# Patient Record
Sex: FEMALE | Race: WHITE | Employment: OTHER | URBAN - METROPOLITAN AREA
[De-identification: names, ages, dates, MRNs, and addresses within clinical notes are randomized per-mention and may not be internally consistent; named-entity substitution may affect disease eponyms.]

---

## 2023-08-09 ENCOUNTER — HOSPITAL ENCOUNTER (INPATIENT)
Age: 83
LOS: 1 days | Discharge: HOME | End: 2023-08-10
Attending: EMERGENCY MEDICINE | Admitting: STUDENT IN AN ORGANIZED HEALTH CARE EDUCATION/TRAINING PROGRAM
Payer: COMMERCIAL

## 2023-08-09 ENCOUNTER — APPOINTMENT (OUTPATIENT)
Dept: CT IMAGING | Age: 83
End: 2023-08-09
Payer: COMMERCIAL

## 2023-08-09 DIAGNOSIS — R10.84 GENERALIZED ABDOMINAL PAIN: Primary | ICD-10-CM

## 2023-08-09 DIAGNOSIS — K85.90 ACUTE PANCREATITIS, UNSPECIFIED COMPLICATION STATUS, UNSPECIFIED PANCREATITIS TYPE: ICD-10-CM

## 2023-08-09 LAB
ALBUMIN SERPL-MCNC: 4.2 G/DL (ref 3.5–5.2)
ALBUMIN/GLOB SERPL: 1.3 {RATIO} (ref 1–2.5)
ALP SERPL-CCNC: 112 U/L (ref 35–104)
ALT SERPL-CCNC: 36 U/L (ref 5–33)
AMYLASE SERPL-CCNC: 151 U/L (ref 28–100)
ANION GAP SERPL CALCULATED.3IONS-SCNC: 12 MMOL/L (ref 9–17)
AST SERPL-CCNC: 27 U/L
BACTERIA URNS QL MICRO: ABNORMAL
BASOPHILS # BLD: 0.1 K/UL (ref 0–0.2)
BASOPHILS NFR BLD: 1 % (ref 0–2)
BILIRUB SERPL-MCNC: 0.4 MG/DL (ref 0.3–1.2)
BILIRUB UR QL STRIP: NEGATIVE
BUN SERPL-MCNC: 33 MG/DL (ref 8–23)
CALCIUM SERPL-MCNC: 9.5 MG/DL (ref 8.6–10.4)
CHLORIDE SERPL-SCNC: 97 MMOL/L (ref 98–107)
CLARITY UR: CLEAR
CO2 SERPL-SCNC: 24 MMOL/L (ref 20–31)
COLOR UR: YELLOW
CREAT SERPL-MCNC: 1.4 MG/DL (ref 0.5–0.9)
EOSINOPHIL # BLD: 0.8 K/UL (ref 0–0.4)
EOSINOPHILS RELATIVE PERCENT: 7 % (ref 1–4)
EPI CELLS #/AREA URNS HPF: ABNORMAL /HPF (ref 0–5)
ERYTHROCYTE [DISTWIDTH] IN BLOOD BY AUTOMATED COUNT: 17.4 % (ref 12.5–15.4)
GFR SERPL CREATININE-BSD FRML MDRD: 37 ML/MIN/1.73M2
GLUCOSE SERPL-MCNC: 97 MG/DL (ref 70–99)
GLUCOSE UR STRIP-MCNC: NEGATIVE MG/DL
HCT VFR BLD AUTO: 33.9 % (ref 36–46)
HGB BLD-MCNC: 11.3 G/DL (ref 12–16)
HGB UR QL STRIP.AUTO: ABNORMAL
KETONES UR STRIP-MCNC: NEGATIVE MG/DL
LEUKOCYTE ESTERASE UR QL STRIP: NEGATIVE
LIPASE SERPL-CCNC: 74 U/L (ref 13–60)
LYMPHOCYTES NFR BLD: 1.1 K/UL (ref 1–4.8)
LYMPHOCYTES RELATIVE PERCENT: 10 % (ref 24–44)
MCH RBC QN AUTO: 30.9 PG (ref 26–34)
MCHC RBC AUTO-ENTMCNC: 33.4 G/DL (ref 31–37)
MCV RBC AUTO: 92.5 FL (ref 80–100)
MONOCYTES NFR BLD: 1.2 K/UL (ref 0.1–1.2)
MONOCYTES NFR BLD: 10 % (ref 2–11)
NEUTROPHILS NFR BLD: 72 % (ref 36–66)
NEUTS SEG NFR BLD: 8.2 K/UL (ref 1.8–7.7)
NITRITE UR QL STRIP: NEGATIVE
PH UR STRIP: 6.5 [PH] (ref 5–8)
PLATELET # BLD AUTO: 237 K/UL (ref 140–450)
PMV BLD AUTO: 7.2 FL (ref 6–12)
POTASSIUM SERPL-SCNC: 4 MMOL/L (ref 3.7–5.3)
PROT SERPL-MCNC: 7.4 G/DL (ref 6.4–8.3)
PROT UR STRIP-MCNC: NEGATIVE MG/DL
RBC # BLD AUTO: 3.67 M/UL (ref 4–5.2)
RBC #/AREA URNS HPF: ABNORMAL /HPF (ref 0–2)
SODIUM SERPL-SCNC: 133 MMOL/L (ref 135–144)
SP GR UR STRIP: 1 (ref 1–1.03)
TROPONIN I SERPL HS-MCNC: 26 NG/L (ref 0–14)
TROPONIN I SERPL HS-MCNC: 30 NG/L (ref 0–14)
UROBILINOGEN UR STRIP-ACNC: NORMAL EU/DL (ref 0–1)
WBC #/AREA URNS HPF: ABNORMAL /HPF (ref 0–5)
WBC OTHER # BLD: 11.3 K/UL (ref 3.5–11)

## 2023-08-09 PROCEDURE — 84484 ASSAY OF TROPONIN QUANT: CPT

## 2023-08-09 PROCEDURE — 85025 COMPLETE CBC W/AUTO DIFF WBC: CPT

## 2023-08-09 PROCEDURE — 83690 ASSAY OF LIPASE: CPT

## 2023-08-09 PROCEDURE — 80053 COMPREHEN METABOLIC PANEL: CPT

## 2023-08-09 PROCEDURE — 2580000003 HC RX 258

## 2023-08-09 PROCEDURE — 99285 EMERGENCY DEPT VISIT HI MDM: CPT

## 2023-08-09 PROCEDURE — 2580000003 HC RX 258: Performed by: NURSE PRACTITIONER

## 2023-08-09 PROCEDURE — 81001 URINALYSIS AUTO W/SCOPE: CPT

## 2023-08-09 PROCEDURE — 82150 ASSAY OF AMYLASE: CPT

## 2023-08-09 PROCEDURE — 74176 CT ABD & PELVIS W/O CONTRAST: CPT

## 2023-08-09 PROCEDURE — 2060000000 HC ICU INTERMEDIATE R&B

## 2023-08-09 PROCEDURE — 36415 COLL VENOUS BLD VENIPUNCTURE: CPT

## 2023-08-09 PROCEDURE — 93005 ELECTROCARDIOGRAM TRACING: CPT

## 2023-08-09 RX ORDER — MAGNESIUM GLUCONATE 27 MG(500)
500 TABLET ORAL DAILY
COMMUNITY

## 2023-08-09 RX ORDER — HEPARIN SODIUM 5000 [USP'U]/ML
5000 INJECTION, SOLUTION INTRAVENOUS; SUBCUTANEOUS 2 TIMES DAILY
Status: DISCONTINUED | OUTPATIENT
Start: 2023-08-09 | End: 2023-08-10 | Stop reason: HOSPADM

## 2023-08-09 RX ORDER — 0.9 % SODIUM CHLORIDE 0.9 %
1000 INTRAVENOUS SOLUTION INTRAVENOUS ONCE
Status: COMPLETED | OUTPATIENT
Start: 2023-08-09 | End: 2023-08-09

## 2023-08-09 RX ORDER — SODIUM CHLORIDE 9 MG/ML
INJECTION, SOLUTION INTRAVENOUS CONTINUOUS
Status: DISCONTINUED | OUTPATIENT
Start: 2023-08-09 | End: 2023-08-10 | Stop reason: HOSPADM

## 2023-08-09 RX ORDER — MAGNESIUM SULFATE 1 G/100ML
1000 INJECTION INTRAVENOUS PRN
Status: DISCONTINUED | OUTPATIENT
Start: 2023-08-09 | End: 2023-08-10 | Stop reason: HOSPADM

## 2023-08-09 RX ORDER — ONDANSETRON 4 MG/1
4 TABLET, ORALLY DISINTEGRATING ORAL EVERY 8 HOURS PRN
Status: DISCONTINUED | OUTPATIENT
Start: 2023-08-09 | End: 2023-08-10 | Stop reason: HOSPADM

## 2023-08-09 RX ORDER — LENALIDOMIDE 5 MG/1
5 CAPSULE ORAL DAILY
COMMUNITY

## 2023-08-09 RX ORDER — ONDANSETRON 2 MG/ML
4 INJECTION INTRAMUSCULAR; INTRAVENOUS EVERY 6 HOURS PRN
Status: DISCONTINUED | OUTPATIENT
Start: 2023-08-09 | End: 2023-08-10 | Stop reason: HOSPADM

## 2023-08-09 RX ORDER — LANOLIN ALCOHOL/MO/W.PET/CERES
500 CREAM (GRAM) TOPICAL DAILY
COMMUNITY

## 2023-08-09 RX ORDER — SODIUM CHLORIDE 0.9 % (FLUSH) 0.9 %
5-40 SYRINGE (ML) INJECTION EVERY 12 HOURS SCHEDULED
Status: DISCONTINUED | OUTPATIENT
Start: 2023-08-09 | End: 2023-08-10 | Stop reason: HOSPADM

## 2023-08-09 RX ORDER — SODIUM CHLORIDE 9 MG/ML
INJECTION, SOLUTION INTRAVENOUS PRN
Status: DISCONTINUED | OUTPATIENT
Start: 2023-08-09 | End: 2023-08-10 | Stop reason: HOSPADM

## 2023-08-09 RX ORDER — SODIUM CHLORIDE 0.9 % (FLUSH) 0.9 %
5-40 SYRINGE (ML) INJECTION PRN
Status: DISCONTINUED | OUTPATIENT
Start: 2023-08-09 | End: 2023-08-10 | Stop reason: HOSPADM

## 2023-08-09 RX ORDER — ACYCLOVIR 200 MG/1
200 CAPSULE ORAL 2 TIMES DAILY
COMMUNITY

## 2023-08-09 RX ORDER — LANOLIN ALCOHOL/MO/W.PET/CERES
1000 CREAM (GRAM) TOPICAL DAILY
COMMUNITY

## 2023-08-09 RX ORDER — POTASSIUM CHLORIDE 7.45 MG/ML
10 INJECTION INTRAVENOUS PRN
Status: DISCONTINUED | OUTPATIENT
Start: 2023-08-09 | End: 2023-08-10 | Stop reason: HOSPADM

## 2023-08-09 RX ORDER — ROSUVASTATIN CALCIUM 5 MG/1
5 TABLET, COATED ORAL DAILY
COMMUNITY

## 2023-08-09 RX ORDER — ASPIRIN 81 MG/1
81 TABLET ORAL DAILY
COMMUNITY

## 2023-08-09 RX ADMIN — SODIUM CHLORIDE, PRESERVATIVE FREE 10 ML: 5 INJECTION INTRAVENOUS at 23:55

## 2023-08-09 RX ADMIN — SODIUM CHLORIDE: 9 INJECTION, SOLUTION INTRAVENOUS at 23:55

## 2023-08-09 RX ADMIN — SODIUM CHLORIDE 1000 ML: 9 INJECTION, SOLUTION INTRAVENOUS at 19:28

## 2023-08-09 ASSESSMENT — PAIN - FUNCTIONAL ASSESSMENT: PAIN_FUNCTIONAL_ASSESSMENT: 0-10

## 2023-08-09 ASSESSMENT — ENCOUNTER SYMPTOMS
BLOOD IN STOOL: 0
VOMITING: 0
CONSTIPATION: 0
RECTAL PAIN: 0
NAUSEA: 0
ABDOMINAL PAIN: 1
DIARRHEA: 1

## 2023-08-09 ASSESSMENT — PAIN SCALES - GENERAL
PAINLEVEL_OUTOF10: 0
PAINLEVEL_OUTOF10: 6

## 2023-08-09 NOTE — ED PROVIDER NOTES
[facility] Emergency Department  [address] [address] [facility] [address]  Phone: [phone]  Fax: Leda Hull          Pt Name: Arabella Kincaid  MRN: [id number]  [address] [facility] [place] 1940  Date of evaluation: 8/9/2023      CHIEF COMPLAINT       Chief Complaint   Patient presents with    Abdominal Pain     Reports abd for a few days. Denies trouble with urinating or bowel movements. Denies n/v/d. HISTORY OF PRESENT ILLNESS       Arabella Kincaid is a 78 y.o. female who presents with abdominal pain. Patient reports having 3 days of abdominal pain that started epigastric and has now lowered to the left lower quadrant. Patient describes the pain as tenderness, denies any nausea or vomiting, fevers or chills. Patient had diarrhea a few days ago also reports constipation here and there. Patient states that she took Pepto-Bismol last night which relieved some of the pain. Patient reports she still has good appetite. Patient has a history of multiple myeloma which she receives chemotherapy for. Patient has a history of partial colectomy due to diverticulitis. Patient denies any blood in stools. EKG showed AV dual-paced rhythm 60 bpm     REVIEW OF SYSTEMS       Review of Systems   Cardiovascular:  Negative for chest pain and palpitations. Gastrointestinal:  Positive for abdominal pain (epigastric and LLQ) and diarrhea. Negative for blood in stool, constipation, nausea, rectal pain and vomiting. All other systems reviewed and are negative. PAST MEDICAL HISTORY    has no past medical history on file. SURGICAL HISTORY      has no past surgical history on file. CURRENT MEDICATIONS       Previous Medications    No medications on file       ALLERGIES     is allergic to latex, adhesive tape, ciprofloxacin, and penicillins. FAMILY HISTORY     has no family status information on file. family history is not on file.     SOCIAL HISTORY

## 2023-08-10 VITALS
OXYGEN SATURATION: 95 % | HEIGHT: 64 IN | DIASTOLIC BLOOD PRESSURE: 58 MMHG | BODY MASS INDEX: 17.24 KG/M2 | RESPIRATION RATE: 16 BRPM | HEART RATE: 60 BPM | TEMPERATURE: 98.4 F | SYSTOLIC BLOOD PRESSURE: 132 MMHG | WEIGHT: 101 LBS

## 2023-08-10 LAB
CA-I BLD-SCNC: 1.23 MMOL/L (ref 1.13–1.33)
INR PPP: 1
LIPASE SERPL-CCNC: 66 U/L (ref 13–60)
MAGNESIUM SERPL-MCNC: 1.9 MG/DL (ref 1.6–2.6)
MYOGLOBIN SERPL-MCNC: 52 NG/ML (ref 25–58)
MYOGLOBIN SERPL-MCNC: 62 NG/ML (ref 25–58)
PHOSPHATE SERPL-MCNC: 2.9 MG/DL (ref 2.6–4.5)
PROTHROMBIN TIME: 10.6 SEC (ref 9.4–12.6)
TRIGL SERPL-MCNC: 40 MG/DL
TROPONIN I SERPL HS-MCNC: 30 NG/L (ref 0–14)
TROPONIN I SERPL HS-MCNC: 33 NG/L (ref 0–14)

## 2023-08-10 PROCEDURE — 83735 ASSAY OF MAGNESIUM: CPT

## 2023-08-10 PROCEDURE — 85610 PROTHROMBIN TIME: CPT

## 2023-08-10 PROCEDURE — 83690 ASSAY OF LIPASE: CPT

## 2023-08-10 PROCEDURE — 83874 ASSAY OF MYOGLOBIN: CPT

## 2023-08-10 PROCEDURE — 84484 ASSAY OF TROPONIN QUANT: CPT

## 2023-08-10 PROCEDURE — 84100 ASSAY OF PHOSPHORUS: CPT

## 2023-08-10 PROCEDURE — 99223 1ST HOSP IP/OBS HIGH 75: CPT | Performed by: STUDENT IN AN ORGANIZED HEALTH CARE EDUCATION/TRAINING PROGRAM

## 2023-08-10 PROCEDURE — 36415 COLL VENOUS BLD VENIPUNCTURE: CPT

## 2023-08-10 PROCEDURE — 82330 ASSAY OF CALCIUM: CPT

## 2023-08-10 PROCEDURE — 84478 ASSAY OF TRIGLYCERIDES: CPT

## 2023-08-10 RX ORDER — CHOLECALCIFEROL (VITAMIN D3) 125 MCG
1000 CAPSULE ORAL DAILY
Status: DISCONTINUED | OUTPATIENT
Start: 2023-08-10 | End: 2023-08-10 | Stop reason: HOSPADM

## 2023-08-10 RX ORDER — ROSUVASTATIN CALCIUM 5 MG/1
5 TABLET, COATED ORAL DAILY
Status: DISCONTINUED | OUTPATIENT
Start: 2023-08-10 | End: 2023-08-10 | Stop reason: HOSPADM

## 2023-08-10 RX ORDER — DICYCLOMINE HYDROCHLORIDE 10 MG/1
20 CAPSULE ORAL
Status: DISCONTINUED | OUTPATIENT
Start: 2023-08-10 | End: 2023-08-10 | Stop reason: HOSPADM

## 2023-08-10 RX ORDER — DICYCLOMINE HYDROCHLORIDE 10 MG/1
20 CAPSULE ORAL
Qty: 120 CAPSULE | Refills: 3 | Status: SHIPPED | OUTPATIENT
Start: 2023-08-10

## 2023-08-10 RX ORDER — MAGNESIUM GLUCONATE 27 MG(500)
500 TABLET ORAL DAILY
Status: DISCONTINUED | OUTPATIENT
Start: 2023-08-10 | End: 2023-08-10

## 2023-08-10 RX ORDER — ACYCLOVIR 200 MG/1
200 CAPSULE ORAL 2 TIMES DAILY
Status: DISCONTINUED | OUTPATIENT
Start: 2023-08-10 | End: 2023-08-10 | Stop reason: HOSPADM

## 2023-08-10 RX ORDER — AMLODIPINE BESYLATE 5 MG/1
5 TABLET ORAL DAILY
COMMUNITY

## 2023-08-10 RX ORDER — ASPIRIN 81 MG/1
81 TABLET ORAL DAILY
Status: DISCONTINUED | OUTPATIENT
Start: 2023-08-10 | End: 2023-08-10 | Stop reason: HOSPADM

## 2023-08-10 RX ORDER — LANOLIN ALCOHOL/MO/W.PET/CERES
500 CREAM (GRAM) TOPICAL DAILY
Status: DISCONTINUED | OUTPATIENT
Start: 2023-08-10 | End: 2023-08-10

## 2023-08-10 RX ORDER — LENALIDOMIDE 5 MG/1
5 CAPSULE ORAL DAILY
Status: DISCONTINUED | OUTPATIENT
Start: 2023-08-10 | End: 2023-08-10 | Stop reason: HOSPADM

## 2023-08-10 RX ORDER — LOSARTAN POTASSIUM 50 MG/1
50 TABLET ORAL DAILY
Status: DISCONTINUED | OUTPATIENT
Start: 2023-08-10 | End: 2023-08-10 | Stop reason: HOSPADM

## 2023-08-10 RX ORDER — AMLODIPINE BESYLATE 5 MG/1
5 TABLET ORAL DAILY
Status: DISCONTINUED | OUTPATIENT
Start: 2023-08-10 | End: 2023-08-10 | Stop reason: HOSPADM

## 2023-08-10 RX ORDER — LOSARTAN POTASSIUM 50 MG/1
50 TABLET ORAL DAILY
COMMUNITY

## 2023-08-10 NOTE — DISCHARGE SUMMARY
[facility]  Office: 7900  1826, DO, Jayy Crisostomo, DO, Morales Stahl, DO, Irena Duggan, DO, Halley Banks MD, Tori Carroll MD, Jovan Hollis MD, Daynell Fried MD,  Hazel Kline MD, Micheal Mcgee MD, Elsy Borden, DO, Hugo Nick MD,  Cydney Dee MD, Ale Orlando MD, Nora Moulton, DO, Gustavus Najjar, MD,  Rajendra Mcduffie, DO, Kvng Clinton MD, Gerald Kanner, MD, Jenelle Arboleda MD, Carlyn Benitez MD,  Stephy Sarabia MD, Eric Sepulveda MD, Terell Soares MD, Cookie Brittle, DO, Trinity Conklin MD,  Dhara Mercedes MD, Cate Garnica, CNP,  Bita Quinones, CNP, Kassy Day, CNP, Adama Del Rosario, CNP,  Alessandro Hussein, [facility], Richardson Muir, CNP, Ajay Torre, CNP, Hansel Angelucci, CNP, Emeli Leigh, CNP, Tammy Soto, CNP, Radha Rosario PA-C, Preet Yadav, CNS, Mathew Silvestre, CNP, Katarzyna Rodriguez, [address] [place] [address]    Discharge Summary     Patient ID: Shayan Cates  :  1940   MRN: [id number]     ACCOUNT:  [de-identified]   Patient's PCP: Estuardo Wilburn PA-C  Admit Date: 2023   Discharge Date: 8/10/2023  Length of Stay: 1  Code Status:  Full Code  Admitting Physician: Tanisha Ochoa MD  Discharge Physician: Tanisha Ochoa MD     Active Discharge Diagnoses:     Hospital Problem Lists:  Principal Problem:    Acute pancreatitis without infection or necrosis  Resolved Problems:    * No resolved hospital problems. *      Admission Condition:  fair     Discharged Condition: fair    Hospital Stay:     Hospital Course:  Shayan Cates is a 78 y.o. female who was admitted for the management of Acute pancreatitis without infection or necrosis , presented to ER with Abdominal Pain (Reports abd for a few days. Denies trouble with urinating or bowel movements. Denies n/v/d. )    Shayan Cates is a 78 y.o.  Non- / non  female who presents with Abdominal Pain (Reports values in this interval not displayed. Recent Labs     08/07/23  1121 08/09/23  1914 08/10/23  0622   PROT 7.9 7.4  --    LABALBU 4.4 4.2  --    AST 33 27  --    ALT 28 36*  --    ALKPHOS 83 112*  --    BILITOT 0.7 0.4  --    AMYLASE  --  151*  --    LIPASE  --  74* 66*     ABG:No results found for: POCPH, PHART, PH, POCPCO2, LCL6ZOW, PCO2, POCPO2, PO2ART, PO2, POCHCO3, WNL6QUZ, HCO3, NBEA, PBEA, BEART, BE, THGBART, THB, HAP0KIM, IQJF0MBS, I1YACUNE, O2SAT, FIO2  No results found for: SPECIAL  No results found for: CULTURE    Radiology:  CT ABDOMEN PELVIS WO CONTRAST Additional Contrast? None    Result Date: 8/9/2023  No acute abnormality in the abdomen or pelvis on the noncontrast CT. No bowel obstruction. Consultations:    Consults:     Final Specialist Recommendations/Findings:   None      The patient was seen and examined on day of discharge and this discharge summary is in conjunction with any daily progress note from day of discharge. Discharge plan:     Disposition: Home    Physician Follow Up:   No follow-up provider specified.      Requiring Further Evaluation/Follow Up POST HOSPITALIZATION/Incidental Findings:     Diet: regular diet    Activity: As tolerated    Instructions to Patient:     Discharge Medications:      Medication List        START taking these medications      dicyclomine 10 MG capsule  Commonly known as: BENTYL  Take 2 capsules by mouth 4 times daily (before meals and nightly)            CONTINUE taking these medications      acyclovir 200 MG capsule  Commonly known as: ZOVIRAX     amLODIPine 5 MG tablet  Commonly known as: NORVASC     aspirin 81 MG EC tablet     losartan 50 MG tablet  Commonly known as: COZAAR     magnesium gluconate 500 MG tablet  Commonly known as: MAGONATE     niacin 500 MG extended release capsule     Revlimid 5 MG chemo capsule  Generic drug: lenalidomide     rosuvastatin 5 MG tablet  Commonly known as: CRESTOR     vitamin B-12 1000 MCG tablet  Commonly

## 2023-08-10 NOTE — PROGRESS NOTES
Herbal and Nutritional Product Restrictions      The following herbal, alternative, and/or nutritional/dietary supplement product(s) has been discontinued per [employer] approved policy:    Magonate, Niacin. Please reorder upon discharge if appropriate.     Thank you,  Lana Antoine, [facility]  8/10/2023 9:20 AM

## 2023-08-10 NOTE — PLAN OF CARE
Problem: Pain  Goal: Verbalizes/displays adequate comfort level or baseline comfort level  Outcome: Progressing  No new signs/symptoms of pain noted, pain rating < 3 on scale 0-10, pain controlled with medication/repositioning. Problem: Safety - Adult  Goal: Free from fall injury  Outcome: Progressing  No falls/injuries this shift, bed in lowest position, brakes on, call light in reach, side rails up x2.

## 2023-08-10 NOTE — H&P
[facility]  Office: 7900  1826, DO, Julito Vargas, DO, Missy Phillips, DO, Yesi Cortes Blood, DO, Julio Cesar Alba MD, Felipe Arriaza MD, Jessica Crawley MD, Karthik Retana MD,  Ashlie Sullivan MD, Lake Ann MD, Anand Pagan, DO, Nori Solares MD,  Francisco Whiting MD, Magaly Pollack MD, Alberto Hare DO, Shelbi Miller MD,  Silas Riedel, DO, Niyah Eason MD, Joseph Calderon MD, Kari Alejo MD, Alice Agosto MD,  Noah Hernandez MD, Graciela Camarena MD, Karla Graves MD, Aaron Murphy DO, Sander Pradhan MD,  Violet Prieto MD, Mita Keen, Gabby Ma, CNP, Madelaine Hawk, CNP, Ganga Aguilera, CNP,  Checo Wilson, BABS, Adi Quispe, CNP, Leonardo Storey, CNP, Severiano Schilder, CNP, Patsy Alston, CNP, Hulan Skiff, CNP, Fredy Chambers PA-C, Katie Silveira, MARY, Rudolph Valenzuela CNP, Caryn Michelle, [address] [place] [address] Drive    HISTORY AND PHYSICAL EXAMINATION            Date:   8/10/2023  Patient name:  Shavon Lindsay  Date of admission:  8/9/2023  6:43 PM  MRN:   [id number]  Account:  [de-identified]  YOB: 1940  PCP:    Shanae Hernandez PA-C  Room:   284/215-76  Code Status:    Full Code      History Obtained From:     patient    History of Present Illness:     Shavon Lindsay is a 78 y.o. Non- / non  female who presents with Abdominal Pain (Reports abd for a few days. Denies trouble with urinating or bowel movements. Denies n/v/d. )   and is admitted to the hospital for the management of Acute pancreatitis without infection or necrosis. 53-year-old female past medical history of multiple myeloma, CAD, anemia, CKD, and hypertension presents emergency room for 3 days of lower abdominal pain. Patient does admit to diarrhea. She denies fever, hematochezia, vomiting, hematemesis, chest pain, sick contacts, recent travel.   CT scan in the ED does not show signs of Lipase    Collection Time: 08/10/23  6:22 AM   Result Value Ref Range    Lipase 66 (H) 13 - 60 U/L   Magnesium    Collection Time: 08/10/23  6:22 AM   Result Value Ref Range    Magnesium 1.9 1.6 - 2.6 mg/dL   Phosphorus    Collection Time: 08/10/23  6:22 AM   Result Value Ref Range    Phosphorus 2.9 2.6 - 4.5 mg/dL   Protime-INR    Collection Time: 08/10/23  6:22 AM   Result Value Ref Range    Protime 10.6 9.4 - 12.6 sec    INR 1.0    TROP/MYOGLOBIN    Collection Time: 08/10/23  6:22 AM   Result Value Ref Range    Troponin, High Sensitivity 33 (H) 0 - 14 ng/L    Myoglobin 52 25 - 58 ng/mL       Imaging/Diagnostics:  CT ABDOMEN PELVIS WO CONTRAST Additional Contrast? None    Result Date: 8/9/2023  No acute abnormality in the abdomen or pelvis on the noncontrast CT. No bowel obstruction. Assessment :      Hospital Problems             Last Modified POA    * (Principal) Acute pancreatitis without infection or necrosis 8/9/2023 Yes       Plan:     Abdominal pain-unknown etiology this time. Will advance diet as tolerated. CT findings unremarkable. Lipase low normal.  Will add Bentyl. Monitor labs daily. Myeloma-continue chemo medication. Follows outpatient heme-onc  Hypertension-continue Lopid  CAD-continue losartan and aspirin. Continue statin. Hyperlipidemia for continue statin    Decision to admit patient for further workup and manage. Patient is admitted as inpatient status because of co-morbidities listed above, severity of signs and symptoms as outlined, requirement for current medical therapies and most importantly because of direct risk to patient if care not provided in a hospital setting. Expected length of stay > 48 hours.  Patient is admitted in the Med/Surge    Medical Decision Making: Bindu Corey MD  8/10/2023  8:24 AM    Copy sent to Dr. Twan Yeh PA-C

## 2023-08-10 NOTE — PROGRESS NOTES
Physical Therapy        Physical Therapy Cancel Note      DATE: 8/10/2023    NAME: Ashly Zhao  MRN: [id number]   : 1940      Patient not seen this date for Physical Therapy due to: Other: the patient is independent within room and getting discharged from the hospital. She declined therapy at this time. Physician aware.       Electronically signed by Ab Kincaid PT on 8/10/2023 at 9:52 AM

## 2023-08-11 LAB
EKG ATRIAL RATE: 60 BPM
EKG P AXIS: 58 DEGREES
EKG P-R INTERVAL: 184 MS
EKG Q-T INTERVAL: 494 MS
EKG QRS DURATION: 160 MS
EKG QTC CALCULATION (BAZETT): 494 MS
EKG R AXIS: -72 DEGREES
EKG T AXIS: 83 DEGREES
EKG VENTRICULAR RATE: 60 BPM

## 2023-08-25 NOTE — PROGRESS NOTES
Physician Progress Note      PATIENT:               Pina Saenz  CSN #:                  [id number]  :                       1940  ADMIT DATE:       2023 6:43 PM  [address] [facility] DATE:        8/10/2023 10:03 AM  RESPONDING  PROVIDER #:        Carleen Colon MD          QUERY TEXT:    Patient admitted with abdominal pain. Noted documentation of both acute   pancreatitis in H&P and noted also abdominal pain of unknown etiology with   noted documentation that ct did not show acute pancreatitis. per lab findings   amylase of 151, lipase of 74>66. If possible, please document in progress   notes and discharge summary if you are evaluating and /or treating any of the   following: The medical record reflects the following:  Risk Factors: age, abdominal pain  Clinical Indicators: admitted with abdominal pain. Noted documentation of both   acute pancreatitis in H&P and noted also abdominal pain of unknown etiology   with noted documentation that ct did not show acute pancreatitis. per lab   findings amylase of 151, amylase 74>66. Treatment: npo except ice chips, CT of abdomen, Bentyl, monitoring, iv fluids   ns at 100 per hour    Thank Anthony Smith RN BSN CCDS  Email [email or web address]  Cell [phone]  office hours M-F 6am to 2:30pm  Options provided:  -- Acute pancreatitis confirmed  -- Abdominal pain of unknown etiology confirmed, acute pancreatitis ruled out   after study  -- Other - I will add my own diagnosis  -- Disagree - Not applicable / Not valid  -- Disagree - Clinically unable to determine / Unknown  -- Refer to Clinical Documentation Reviewer    PROVIDER RESPONSE TEXT:    Abdominal pain of unknown etiology is confirmed and acute pancreatitis is   ruled out after study    Query created by: Yvette Montiel on 2023 9:35 AM      Electronically signed by:   Carleen Colon MD 2023 10:53 AM

## 2024-04-12 ENCOUNTER — HOSPITAL ENCOUNTER (INPATIENT)
Age: 84
LOS: 3 days | Discharge: NURSING, REHAB OR LONG-TERM CARE | DRG: 643 | End: 2024-04-16
Attending: EMERGENCY MEDICINE | Admitting: HOSPITALIST
Payer: COMMERCIAL

## 2024-04-12 ENCOUNTER — APPOINTMENT (OUTPATIENT)
Dept: GENERAL RADIOLOGY | Age: 84
DRG: 643 | End: 2024-04-12
Payer: COMMERCIAL

## 2024-04-12 DIAGNOSIS — R53.83 FATIGUE, UNSPECIFIED TYPE: Primary | ICD-10-CM

## 2024-04-12 DIAGNOSIS — R79.89 ELEVATED TROPONIN: ICD-10-CM

## 2024-04-12 LAB
ANION GAP SERPL CALCULATED.3IONS-SCNC: 11 MMOL/L (ref 9–17)
BACTERIA URNS QL MICRO: ABNORMAL
BASOPHILS # BLD: 0.02 K/UL (ref 0–0.2)
BASOPHILS NFR BLD: 0 % (ref 0–2)
BILIRUB UR QL STRIP: NEGATIVE
BUN SERPL-MCNC: 29 MG/DL (ref 8–23)
CALCIUM SERPL-MCNC: 8.9 MG/DL (ref 8.6–10.4)
CHARACTER UR: ABNORMAL
CHLORIDE SERPL-SCNC: 94 MMOL/L (ref 98–107)
CLARITY UR: CLEAR
CO2 SERPL-SCNC: 24 MMOL/L (ref 20–31)
COLOR UR: YELLOW
CREAT SERPL-MCNC: 0.9 MG/DL (ref 0.5–0.9)
EKG ATRIAL RATE: 72 BPM
EKG P AXIS: 67 DEGREES
EKG Q-T INTERVAL: 452 MS
EKG QRS DURATION: 180 MS
EKG QTC CALCULATION (BAZETT): 494 MS
EKG R AXIS: -74 DEGREES
EKG T AXIS: 92 DEGREES
EKG VENTRICULAR RATE: 72 BPM
EOSINOPHIL # BLD: 0.7 K/UL (ref 0–0.4)
EOSINOPHILS RELATIVE PERCENT: 5 % (ref 1–4)
EPI CELLS #/AREA URNS HPF: ABNORMAL /HPF (ref 0–5)
ERYTHROCYTE [DISTWIDTH] IN BLOOD BY AUTOMATED COUNT: 14.9 % (ref 12.5–15.4)
FLUAV AG SPEC QL: NEGATIVE
FLUBV AG SPEC QL: NEGATIVE
GFR SERPL CREATININE-BSD FRML MDRD: 62 ML/MIN/1.73M2
GLUCOSE SERPL-MCNC: 114 MG/DL (ref 70–99)
GLUCOSE UR STRIP-MCNC: NEGATIVE MG/DL
HCT VFR BLD AUTO: 39.8 % (ref 36–46)
HGB BLD-MCNC: 14.2 G/DL (ref 12–16)
HGB UR QL STRIP.AUTO: NEGATIVE
KETONES UR STRIP-MCNC: NEGATIVE MG/DL
LEUKOCYTE ESTERASE UR QL STRIP: NEGATIVE
LYMPHOCYTES NFR BLD: 0.88 K/UL (ref 1–4.8)
LYMPHOCYTES RELATIVE PERCENT: 6 % (ref 24–44)
MCH RBC QN AUTO: 33.4 PG (ref 26–34)
MCHC RBC AUTO-ENTMCNC: 35.7 G/DL (ref 31–37)
MCV RBC AUTO: 93.6 FL (ref 80–100)
MONOCYTES NFR BLD: 0.8 K/UL (ref 0.1–1.2)
MONOCYTES NFR BLD: 5 % (ref 2–11)
NEUTROPHILS NFR BLD: 84 % (ref 36–66)
NEUTS SEG NFR BLD: 12.76 K/UL (ref 1.8–7.7)
NITRITE UR QL STRIP: NEGATIVE
PH UR STRIP: 6 [PH] (ref 5–8)
PLATELET # BLD AUTO: 171 K/UL (ref 140–450)
PMV BLD AUTO: 8.4 FL (ref 8–14)
POTASSIUM SERPL-SCNC: 4.2 MMOL/L (ref 3.7–5.3)
PROT UR STRIP-MCNC: ABNORMAL MG/DL
RBC # BLD AUTO: 4.25 M/UL (ref 4–5.2)
RBC #/AREA URNS HPF: ABNORMAL /HPF (ref 0–2)
SARS-COV-2 RDRP RESP QL NAA+PROBE: NOT DETECTED
SODIUM SERPL-SCNC: 129 MMOL/L (ref 135–144)
SP GR UR STRIP: 1.02 (ref 1–1.03)
SPECIMEN DESCRIPTION: NORMAL
TROPONIN I SERPL HS-MCNC: 40 NG/L (ref 0–14)
TROPONIN I SERPL HS-MCNC: 41 NG/L (ref 0–14)
UROBILINOGEN UR STRIP-ACNC: NORMAL EU/DL (ref 0–1)
WBC #/AREA URNS HPF: ABNORMAL /HPF (ref 0–5)
WBC OTHER # BLD: 15.2 K/UL (ref 3.5–11)

## 2024-04-12 PROCEDURE — 87635 SARS-COV-2 COVID-19 AMP PRB: CPT

## 2024-04-12 PROCEDURE — 85025 COMPLETE CBC W/AUTO DIFF WBC: CPT

## 2024-04-12 PROCEDURE — 93005 ELECTROCARDIOGRAM TRACING: CPT

## 2024-04-12 PROCEDURE — 2580000003 HC RX 258: Performed by: NURSE PRACTITIONER

## 2024-04-12 PROCEDURE — 36415 COLL VENOUS BLD VENIPUNCTURE: CPT

## 2024-04-12 PROCEDURE — 80048 BASIC METABOLIC PNL TOTAL CA: CPT

## 2024-04-12 PROCEDURE — 96360 HYDRATION IV INFUSION INIT: CPT

## 2024-04-12 PROCEDURE — 81001 URINALYSIS AUTO W/SCOPE: CPT

## 2024-04-12 PROCEDURE — 71045 X-RAY EXAM CHEST 1 VIEW: CPT

## 2024-04-12 PROCEDURE — 84484 ASSAY OF TROPONIN QUANT: CPT

## 2024-04-12 PROCEDURE — G0378 HOSPITAL OBSERVATION PER HR: HCPCS

## 2024-04-12 PROCEDURE — 87804 INFLUENZA ASSAY W/OPTIC: CPT

## 2024-04-12 PROCEDURE — 99285 EMERGENCY DEPT VISIT HI MDM: CPT

## 2024-04-12 RX ORDER — SODIUM CHLORIDE 9 MG/ML
INJECTION, SOLUTION INTRAVENOUS PRN
Status: DISCONTINUED | OUTPATIENT
Start: 2024-04-12 | End: 2024-04-16 | Stop reason: HOSPADM

## 2024-04-12 RX ORDER — MAGNESIUM SULFATE 1 G/100ML
1000 INJECTION INTRAVENOUS PRN
Status: DISCONTINUED | OUTPATIENT
Start: 2024-04-12 | End: 2024-04-16 | Stop reason: HOSPADM

## 2024-04-12 RX ORDER — ACETAMINOPHEN 325 MG/1
650 TABLET ORAL EVERY 6 HOURS PRN
Status: DISCONTINUED | OUTPATIENT
Start: 2024-04-12 | End: 2024-04-16 | Stop reason: HOSPADM

## 2024-04-12 RX ORDER — ACETAMINOPHEN 650 MG/1
650 SUPPOSITORY RECTAL EVERY 6 HOURS PRN
Status: DISCONTINUED | OUTPATIENT
Start: 2024-04-12 | End: 2024-04-16 | Stop reason: HOSPADM

## 2024-04-12 RX ORDER — SODIUM CHLORIDE 0.9 % (FLUSH) 0.9 %
10 SYRINGE (ML) INJECTION PRN
Status: DISCONTINUED | OUTPATIENT
Start: 2024-04-12 | End: 2024-04-16 | Stop reason: HOSPADM

## 2024-04-12 RX ORDER — ONDANSETRON 2 MG/ML
4 INJECTION INTRAMUSCULAR; INTRAVENOUS EVERY 6 HOURS PRN
Status: DISCONTINUED | OUTPATIENT
Start: 2024-04-12 | End: 2024-04-16 | Stop reason: HOSPADM

## 2024-04-12 RX ORDER — SODIUM CHLORIDE 9 MG/ML
INJECTION, SOLUTION INTRAVENOUS CONTINUOUS
Status: DISCONTINUED | OUTPATIENT
Start: 2024-04-12 | End: 2024-04-14

## 2024-04-12 RX ORDER — POLYETHYLENE GLYCOL 3350 17 G/17G
17 POWDER, FOR SOLUTION ORAL DAILY PRN
Status: DISCONTINUED | OUTPATIENT
Start: 2024-04-12 | End: 2024-04-16 | Stop reason: HOSPADM

## 2024-04-12 RX ORDER — ENOXAPARIN SODIUM 100 MG/ML
40 INJECTION SUBCUTANEOUS DAILY
Status: DISCONTINUED | OUTPATIENT
Start: 2024-04-13 | End: 2024-04-16 | Stop reason: HOSPADM

## 2024-04-12 RX ORDER — SODIUM CHLORIDE 0.9 % (FLUSH) 0.9 %
5-40 SYRINGE (ML) INJECTION EVERY 12 HOURS SCHEDULED
Status: DISCONTINUED | OUTPATIENT
Start: 2024-04-12 | End: 2024-04-16 | Stop reason: HOSPADM

## 2024-04-12 RX ORDER — POTASSIUM CHLORIDE 7.45 MG/ML
10 INJECTION INTRAVENOUS PRN
Status: DISCONTINUED | OUTPATIENT
Start: 2024-04-12 | End: 2024-04-16 | Stop reason: HOSPADM

## 2024-04-12 RX ORDER — POTASSIUM CHLORIDE 20 MEQ/1
40 TABLET, EXTENDED RELEASE ORAL PRN
Status: DISCONTINUED | OUTPATIENT
Start: 2024-04-12 | End: 2024-04-16 | Stop reason: HOSPADM

## 2024-04-12 RX ORDER — ONDANSETRON 4 MG/1
4 TABLET, ORALLY DISINTEGRATING ORAL EVERY 8 HOURS PRN
Status: DISCONTINUED | OUTPATIENT
Start: 2024-04-12 | End: 2024-04-16 | Stop reason: HOSPADM

## 2024-04-12 RX ORDER — 0.9 % SODIUM CHLORIDE 0.9 %
500 INTRAVENOUS SOLUTION INTRAVENOUS ONCE
Status: COMPLETED | OUTPATIENT
Start: 2024-04-12 | End: 2024-04-12

## 2024-04-12 RX ADMIN — SODIUM CHLORIDE: 9 INJECTION, SOLUTION INTRAVENOUS at 23:31

## 2024-04-12 RX ADMIN — SODIUM CHLORIDE 500 ML: 9 INJECTION, SOLUTION INTRAVENOUS at 19:25

## 2024-04-12 ASSESSMENT — PAIN - FUNCTIONAL ASSESSMENT: PAIN_FUNCTIONAL_ASSESSMENT: NONE - DENIES PAIN

## 2024-04-12 NOTE — ED PROVIDER NOTES
[facility] Emergency Department  [address]  [facility] [address]  Phone: [phone]  Fax: [phone]      Attending Physician Attestation    I performed a history and physical examination of the patient and discussed management with the mid level provider. I reviewed the mid level provider's note and agree with the documented findings and plan of care. Any areas of disagreement are noted on the chart. I was personally present for the key portions of any procedures. I have documented in the chart those procedures where I was not present during the key portions. I have reviewed the emergency nurses triage note. I agree with the chief complaint, past medical history, past surgical history, allergies, medications, social and family history as documented unless otherwise noted below. Documentation of the HPI, Physical Exam and Medical Decision Making performed by mid level providers is based on my personal performance of the HPI, PE and MDM. For Physician Assistant/ Nurse Practitioner cases/documentation I have personally evaluated this patient and have completed at least one if not all key elements of the E/M (history, physical exam, and MDM). Additional findings are as noted.      CHIEF COMPLAINT       Chief Complaint   Patient presents with    Fatigue     Pt arrives with co fatigue and weakness which pt states has worsened today. Pt does have bone cancer x 4 years.          HISTORY OF PRESENT ILLNESS    Dianne Siegel is a 84 y.o. female who presents fatigue and generalized weakness.       PAST MEDICAL HISTORY    has a past medical history of Cancer (HCC).    SURGICAL HISTORY      has no past surgical history on file.    CURRENT MEDICATIONS       Previous Medications    ACYCLOVIR (ZOVIRAX) 200 MG CAPSULE    Take 1 capsule by mouth 2 times daily    AMLODIPINE (NORVASC) 5 MG TABLET    Take 1 tablet by mouth daily    ASPIRIN 81 MG EC TABLET    Take 1 tablet by mouth daily     A Antigen NEGATIVE NEGATIVE    Flu B Antigen NEGATIVE NEGATIVE   BMP   Result Value Ref Range    Sodium 129 (L) 135 - 144 mmol/L    Potassium 4.2 3.7 - 5.3 mmol/L    Chloride 94 (L) 98 - 107 mmol/L    CO2 24 20 - 31 mmol/L    Anion Gap 11 9 - 17 mmol/L    Glucose 114 (H) 70 - 99 mg/dL    BUN 29 (H) 8 - 23 mg/dL    Creatinine 0.9 0.5 - 0.9 mg/dL    Est, Glom Filt Rate 62 >60 mL/min/1.73m2    Calcium 8.9 8.6 - 10.4 mg/dL   CBC with Auto Differential   Result Value Ref Range    WBC 15.2 (H) 3.5 - 11.0 k/uL    RBC 4.25 4.0 - 5.2 m/uL    Hemoglobin 14.2 12.0 - 16.0 g/dL    Hematocrit 39.8 36 - 46 %    MCV 93.6 80 - 100 fL    MCH 33.4 26 - 34 pg    MCHC 35.7 31 - 37 g/dL    RDW 14.9 12.5 - 15.4 %    Platelets 171 140 - 450 k/uL    MPV 8.4 8.0 - 14.0 fL    Neutrophils % 84 (H) 36 - 66 %    Lymphocytes % 6 (L) 24 - 44 %    Monocytes % 5 2 - 11 %    Eosinophils % 5 (H) 1 - 4 %    Basophils % 0 0 - 2 %    Neutrophils Absolute 12.76 (H) 1.8 - 7.7 k/uL    Lymphocytes Absolute 0.88 (L) 1.0 - 4.8 k/uL    Monocytes Absolute 0.80 0.1 - 1.2 k/uL    Eosinophils Absolute 0.70 (H) 0.0 - 0.4 k/uL    Basophils Absolute 0.02 0.0 - 0.2 k/uL   Troponin   Result Value Ref Range    Troponin, High Sensitivity 40 (H) 0 - 14 ng/L   Urinalysis with Reflex to Culture    Specimen: Urine   Result Value Ref Range    Color, UA Yellow Yellow    Turbidity UA Clear Clear    Glucose, Ur NEGATIVE NEGATIVE mg/dL    Bilirubin Urine NEGATIVE NEGATIVE    Ketones, Urine NEGATIVE NEGATIVE mg/dL    Specific Gravity, UA 1.020 1.005 - 1.030    Urine Hgb NEGATIVE NEGATIVE    pH, UA 6.0 5.0 - 8.0    Protein, UA 2+ (A) NEGATIVE mg/dL    Urobilinogen, Urine Normal 0.0 - 1.0 EU/dL    Nitrite, Urine NEGATIVE NEGATIVE    Leukocyte Esterase, Urine NEGATIVE NEGATIVE   Microscopic Urinalysis   Result Value Ref Range    WBC, UA 0 TO 2 0 - 5 /HPF    RBC, UA 0 TO 2 0 - 2 /HPF    Epithelial Cells UA 2 TO 5 0 - 5 /HPF    Bacteria, UA FEW (A) None    Other Observations UA (A) NOT

## 2024-04-12 NOTE — ED PROVIDER NOTES
[facility] EMERGENCY DEPARTMENT  EMERGENCY DEPARTMENT ENCOUNTER      Pt Name: Dianne Siegel  MRN: [id number]  Birthdate 1940  Date of evaluation: 4/12/2024  Provider: OXANA Alvarez CNP    CHIEF COMPLAINT       Chief Complaint   Patient presents with    Fatigue     Pt arrives with co fatigue and weakness which pt states has worsened today. Pt does have bone cancer x 4 years.          HISTORY OF PRESENT ILLNESS   (Location/Symptom, Timing/Onset, Context/Setting, Quality, Duration, Modifying Factors, Severity)  Note limiting factors.   Dianne Siegel is a 84 y.o. female who presents to the emergency department for evaluation of fatigue and weakness that began this am.  Patient states she has history of multiple myeloma x 4 years.  She states she is currently undergoing treatment 2 weeks on and 2 weeks off.  She states she receives injections.  She states she is unsure why she is feeling the symptoms as she denies any cough or cold symptoms fevers or chills.  She denies any abdominal pain nausea vomiting or diarrhea.  She states she has been eating and drinking normally for herself.        Nursing Notes were reviewed.    REVIEW OF SYSTEMS       Constitutional: No fevers or chills + generalized weakness fatigue  HEENT: No sore throat, rhinorrhea, or earache   Eyes: No blurry vision or double vision no drainage   Cardiovascular: No chest pain or tachycardia   Respiratory: No wheezing or shortness of breath no cough   Gastrointestinal: No nausea, vomiting, diarrhea, constipation, or abdominal pain   : No hematuria or dysuria   Musculoskeletal: No swelling or pain   Skin: No rash   Neurological: No focal neurologic complaints, paresthesias, weakness, or headache      Except as noted above the remainder of the review of systems was reviewed and negative.       PAST MEDICAL HISTORY     Past Medical History:   Diagnosis Date    Cancer (HCC)        SURGICAL HISTORY     History reviewed. No

## 2024-04-13 PROBLEM — E87.1 HYPONATREMIA: Status: ACTIVE | Noted: 2024-04-13

## 2024-04-13 LAB
ANION GAP SERPL CALCULATED.3IONS-SCNC: 11 MMOL/L (ref 9–17)
ANION GAP SERPL CALCULATED.3IONS-SCNC: 9 MMOL/L (ref 9–17)
BUN SERPL-MCNC: 19 MG/DL (ref 8–23)
CALCIUM SERPL-MCNC: 8.2 MG/DL (ref 8.6–10.4)
CHLORIDE SERPL-SCNC: 95 MMOL/L (ref 98–107)
CHLORIDE SERPL-SCNC: 97 MMOL/L (ref 98–107)
CO2 SERPL-SCNC: 23 MMOL/L (ref 20–31)
CO2 SERPL-SCNC: 24 MMOL/L (ref 20–31)
CREAT SERPL-MCNC: 0.7 MG/DL (ref 0.5–0.9)
ERYTHROCYTE [DISTWIDTH] IN BLOOD BY AUTOMATED COUNT: 16 % (ref 12.5–15.4)
GFR SERPL CREATININE-BSD FRML MDRD: 84 ML/MIN/1.73M2
GLUCOSE SERPL-MCNC: 98 MG/DL (ref 70–99)
HCT VFR BLD AUTO: 36.1 % (ref 36–46)
HGB BLD-MCNC: 12.5 G/DL (ref 12–16)
INR PPP: 1
MCH RBC QN AUTO: 32.7 PG (ref 26–34)
MCHC RBC AUTO-ENTMCNC: 34.6 G/DL (ref 31–37)
MCV RBC AUTO: 94.5 FL (ref 80–100)
OSMOLALITY UR: 427 MOSM/KG (ref 80–1300)
PLATELET # BLD AUTO: 178 K/UL (ref 140–450)
PMV BLD AUTO: 6.7 FL (ref 6–12)
POTASSIUM SERPL-SCNC: 3.5 MMOL/L (ref 3.7–5.3)
POTASSIUM SERPL-SCNC: 3.6 MMOL/L (ref 3.7–5.3)
PROTHROMBIN TIME: 11.1 SEC (ref 9.4–12.6)
RBC # BLD AUTO: 3.82 M/UL (ref 4–5.2)
SODIUM SERPL-SCNC: 128 MMOL/L (ref 135–144)
SODIUM SERPL-SCNC: 131 MMOL/L (ref 135–144)
SODIUM UR-SCNC: 103 MMOL/L
WBC OTHER # BLD: 13 K/UL (ref 3.5–11)

## 2024-04-13 PROCEDURE — 85610 PROTHROMBIN TIME: CPT

## 2024-04-13 PROCEDURE — 80048 BASIC METABOLIC PNL TOTAL CA: CPT

## 2024-04-13 PROCEDURE — 83930 ASSAY OF BLOOD OSMOLALITY: CPT

## 2024-04-13 PROCEDURE — 84300 ASSAY OF URINE SODIUM: CPT

## 2024-04-13 PROCEDURE — 97162 PT EVAL MOD COMPLEX 30 MIN: CPT

## 2024-04-13 PROCEDURE — 85027 COMPLETE CBC AUTOMATED: CPT

## 2024-04-13 PROCEDURE — 99222 1ST HOSP IP/OBS MODERATE 55: CPT | Performed by: HOSPITALIST

## 2024-04-13 PROCEDURE — 6370000000 HC RX 637 (ALT 250 FOR IP): Performed by: HOSPITALIST

## 2024-04-13 PROCEDURE — 97535 SELF CARE MNGMENT TRAINING: CPT

## 2024-04-13 PROCEDURE — 97530 THERAPEUTIC ACTIVITIES: CPT

## 2024-04-13 PROCEDURE — 36415 COLL VENOUS BLD VENIPUNCTURE: CPT

## 2024-04-13 PROCEDURE — 83935 ASSAY OF URINE OSMOLALITY: CPT

## 2024-04-13 PROCEDURE — 96372 THER/PROPH/DIAG INJ SC/IM: CPT

## 2024-04-13 PROCEDURE — 80051 ELECTROLYTE PANEL: CPT

## 2024-04-13 PROCEDURE — 1200000000 HC SEMI PRIVATE

## 2024-04-13 PROCEDURE — 6360000002 HC RX W HCPCS: Performed by: NURSE PRACTITIONER

## 2024-04-13 PROCEDURE — 97166 OT EVAL MOD COMPLEX 45 MIN: CPT

## 2024-04-13 RX ORDER — DICYCLOMINE HYDROCHLORIDE 10 MG/1
20 CAPSULE ORAL
Status: DISCONTINUED | OUTPATIENT
Start: 2024-04-13 | End: 2024-04-16 | Stop reason: HOSPADM

## 2024-04-13 RX ORDER — MAGNESIUM GLUCONATE 27 MG(500)
500 TABLET ORAL DAILY
Status: DISCONTINUED | OUTPATIENT
Start: 2024-04-13 | End: 2024-04-13

## 2024-04-13 RX ORDER — LOSARTAN POTASSIUM 25 MG/1
50 TABLET ORAL DAILY
Status: DISCONTINUED | OUTPATIENT
Start: 2024-04-13 | End: 2024-04-16 | Stop reason: HOSPADM

## 2024-04-13 RX ORDER — LANOLIN ALCOHOL/MO/W.PET/CERES
500 CREAM (GRAM) TOPICAL DAILY
Status: DISCONTINUED | OUTPATIENT
Start: 2024-04-13 | End: 2024-04-13

## 2024-04-13 RX ORDER — CHOLECALCIFEROL (VITAMIN D3) 125 MCG
1000 CAPSULE ORAL DAILY
Status: DISCONTINUED | OUTPATIENT
Start: 2024-04-13 | End: 2024-04-16 | Stop reason: HOSPADM

## 2024-04-13 RX ORDER — AMLODIPINE BESYLATE 5 MG/1
5 TABLET ORAL DAILY
Status: DISCONTINUED | OUTPATIENT
Start: 2024-04-13 | End: 2024-04-16 | Stop reason: HOSPADM

## 2024-04-13 RX ORDER — ASPIRIN 81 MG/1
81 TABLET ORAL DAILY
Status: DISCONTINUED | OUTPATIENT
Start: 2024-04-13 | End: 2024-04-16 | Stop reason: HOSPADM

## 2024-04-13 RX ORDER — ACYCLOVIR 200 MG/1
200 CAPSULE ORAL 2 TIMES DAILY
Status: DISCONTINUED | OUTPATIENT
Start: 2024-04-13 | End: 2024-04-16 | Stop reason: HOSPADM

## 2024-04-13 RX ORDER — TRAMADOL HYDROCHLORIDE 50 MG/1
50 TABLET ORAL EVERY 6 HOURS PRN
Status: DISCONTINUED | OUTPATIENT
Start: 2024-04-13 | End: 2024-04-16 | Stop reason: HOSPADM

## 2024-04-13 RX ORDER — TRAMADOL HYDROCHLORIDE 50 MG/1
50 TABLET ORAL EVERY 6 HOURS PRN
COMMUNITY
Start: 2022-07-21

## 2024-04-13 RX ORDER — ROSUVASTATIN CALCIUM 5 MG/1
5 TABLET, COATED ORAL DAILY
Status: DISCONTINUED | OUTPATIENT
Start: 2024-04-13 | End: 2024-04-16 | Stop reason: HOSPADM

## 2024-04-13 RX ADMIN — ASPIRIN 81 MG: 81 TABLET, COATED ORAL at 12:12

## 2024-04-13 RX ADMIN — TRAMADOL HYDROCHLORIDE 50 MG: 50 TABLET, COATED ORAL at 12:12

## 2024-04-13 RX ADMIN — ACYCLOVIR 200 MG: 200 CAPSULE ORAL at 21:12

## 2024-04-13 RX ADMIN — TRAMADOL HYDROCHLORIDE 50 MG: 50 TABLET, COATED ORAL at 19:28

## 2024-04-13 RX ADMIN — ACYCLOVIR 200 MG: 200 CAPSULE ORAL at 12:12

## 2024-04-13 RX ADMIN — ENOXAPARIN SODIUM 40 MG: 100 INJECTION SUBCUTANEOUS at 10:40

## 2024-04-13 RX ADMIN — AMLODIPINE BESYLATE 5 MG: 5 TABLET ORAL at 12:12

## 2024-04-13 RX ADMIN — LOSARTAN POTASSIUM 50 MG: 25 TABLET, FILM COATED ORAL at 12:12

## 2024-04-13 ASSESSMENT — PAIN DESCRIPTION - ORIENTATION: ORIENTATION: RIGHT

## 2024-04-13 ASSESSMENT — PAIN DESCRIPTION - LOCATION: LOCATION: ARM;SHOULDER

## 2024-04-13 ASSESSMENT — PAIN SCALES - GENERAL
PAINLEVEL_OUTOF10: 8
PAINLEVEL_OUTOF10: 3

## 2024-04-13 ASSESSMENT — PAIN DESCRIPTION - DESCRIPTORS: DESCRIPTORS: ACHING;DISCOMFORT

## 2024-04-13 NOTE — PROGRESS NOTES
Physical Therapy  Facility/Department: [facility] EMERGENCY DEPARTMENT  Physical Therapy Initial Assessment    Name: Dianne Siegel  : 1940  MRN: [id number]  Date of Service: 2024  Chief Complaint   Patient presents with    Fatigue     Pt arrives with co fatigue and weakness which pt states has worsened today. Pt does have bone cancer x 4 years.       Discharge Recommendations:  Patient would benefit from continued therapy after discharge, Patient able to tolerate 3hrs of therapy a day   PT Equipment Recommendations  Equipment Needed: Yes (TBD)      Patient Diagnosis(es): The primary encounter diagnosis was Fatigue, unspecified type. A diagnosis of Elevated troponin was also pertinent to this visit.  Past Medical History:  has a past medical history of Cancer (HCC).  Past Surgical History:  has no past surgical history on file.    Assessment   Body Structures, Functions, Activity Limitations Requiring Skilled Therapeutic Intervention: Decreased safe awareness;Decreased endurance;Decreased balance;Decreased strength;Decreased functional mobility   Assessment: Pt being seen for strength, mobility and safety deficits. Pt lives with spouse and adult son who has special needs but able to sometimes help. Prior level of function includes being independent in ambulation with 4 WW for household ambulation and used  as support for community ambulation . At time of eval, pt was very weak and fatigued; mod A x2 with bed mobility, min A x2  for sit<>Stand at bedside with RW, pt demo retro lean; only able to take side steps to HOB (about 2 ft) with min A x 2 for bal using RW. Pt is deemed unsafe to return . Pt would benefit from continued therapy to work on functional mobility skills, strengthening, gait training, balance retraining and endurance training.  Therapy Prognosis: Good  Decision Making: Medium Complexity  Requires PT Follow-Up: Yes  Activity Tolerance  Activity Tolerance: Patient  Responsibilities: Yes  Meal Prep Responsibility: Primary  Laundry Responsibility: Primary  Cleaning Responsibility: Secondary (high level cleaning tasks peformed by cleaning company)  Shopping Responsibility: Primary  Health Care Management: Primary  Ambulation Assistance: Independent (used 4WW for household ambulation and used  for support for community ambulation)  Transfer Assistance: Independent  Active : No  Patient's  Info:  drives  Mode of Transportation: [facility]  Occupation: Retired  Type of Occupation: Nurse  Leisure & Hobbies: Enjoys going out to eat  Vision/Hearing  Vision  Vision: Impaired  Vision Exceptions: Wears glasses for reading  Hearing  Hearing: Within functional limits    Cognition   Orientation  Overall Orientation Status: Within Normal Limits  Orientation Level: Oriented to place;Oriented to situation;Oriented to person  Cognition  Overall Cognitive Status: Exceptions  Arousal/Alertness: Delayed responses to stimuli (pt fatigued)  Following Commands: Follows multistep commands with increased time;Follows one step commands consistently;Follows multistep commands with repitition  Attention Span: Attends with cues to redirect  Memory: Appears intact  Safety Judgement: Decreased awareness of need for assistance;Decreased awareness of need for safety  Problem Solving: Assistance required to generate solutions;Assistance required to correct errors made;Assistance required to implement solutions;Decreased awareness of errors;Assistance required to identify errors made  Insights: Decreased awareness of deficits  Initiation: Requires cues for some  Sequencing: Requires cues for some     Objective               AROM RLE (degrees)  RLE AROM: WFL  AROM LLE (degrees)  LLE AROM : WFL  AROM RUE (degrees)  RUE AROM : Exceptions  RUE General AROM: see OT eval for details; has R shoulder pain limiting AROM  AROM LUE (degrees)  LUE General AROM: see OT eval for details  Strength

## 2024-04-13 NOTE — PROGRESS NOTES
Occupational Therapy  Facility/Department: [facility] EMERGENCY DEPARTMENT  Occupational Therapy Initial Assessment    Name: Dianne Siegel  : 1940  MRN: [id number]  Date of Service: 2024    Chief Complaint   Patient presents with    Fatigue     Pt arrives with co fatigue and weakness which pt states has worsened today. Pt does have bone cancer x 4 years.      Discharge Recommendations:  Further therapy recommended at discharge.The patient may be able to tolerate at least 3 hours of therapy per day over 5 days or 15 hours over 7 days.   This patient may benefit from a Physical Medicine and Rehab consult.       Patient Diagnosis(es): The primary encounter diagnosis was Fatigue, unspecified type. A diagnosis of Elevated troponin was also pertinent to this visit.  Past Medical History:  has a past medical history of Cancer (HCC).  Past Surgical History:  has no past surgical history on file.       Assessment   Performance deficits / Impairments: Decreased functional mobility ;Decreased ADL status;Decreased ROM;Decreased strength;Decreased safe awareness;Decreased cognition;Decreased fine motor control;Decreased high-level IADLs;Decreased balance;Decreased endurance;Decreased coordination  Assessment: Pt presents with decreased ADL status secondary to above noted deficits, most significantly decreased activity tolerance/strength/balance. Pt to benefit from continued therapy services while hospitalized to maximize pt's safety and independence in performing functional tasks. At this time, pt has not demonstrated the functional ability to safely return to prior living arrangements, continued skilled OT intervention recommended prior to an eventual return to home.  Prognosis: Good  Decision Making: Medium Complexity  REQUIRES OT FOLLOW-UP: Yes  Activity Tolerance  Activity Tolerance: Patient limited by fatigue;Patient limited by pain      Safety Devices  Type of Devices: Call light within

## 2024-04-13 NOTE — H&P
[facility]  Office: [phone]  Trae Melendez DO, Darshan Leach, DO, Michael Valdes DO, Bandar Duggan DO, Becca Urbano MD, Giana Flores MD, Shoaib Enciso MD, Madelin Rdz MD,  Ian Reyes MD, Latanya Soares MD, Diana Burgess MD,  Evangelina Bryson DO, Dana Churchill MD, Serge Ornelas MD, Tony Melendez DO, Sally Gilman MD,  Mikey Zhang DO, Chrissie Bhakta MD, Blank Rico MD, Joanne Aldridge MD, Kimberly Corcoran MD,  Robbie Gore MD, Chantal Scott MD, Levi Cornell MD, Sai Morejon MD, Holden Conway MD, Christine Zazueta MD, Quinn Sánchez DO, Donnell Baez DO, Maria Eugenia Mesa MD,  Aroldo Villanueva MD, Shirley Waterhouse, CNP,  Arleen Johnson CNP, Rosendo Gonzalez, CNP,  Arabella Rodas, DNP, Idalmis Villalpando, CNP, Leida Ko, CNP, Teetee Adam, CNP, Lily Souza, CNP, Afshan Aranda PALuhC, Susie Frost PA-C, Valentine Eduardo, CNP, Tresa Gurrola, CNP, Tammie Mahan, CNP, Lulu Navarro, CNP, Debi Rodriguez, CNP, Nuria Michel, CNS, Hansa Butler, CNP, Sujata Matthews CNP, Tracy Schwab, CNP         [facility]   IN-PATIENT SERVICE   [facility]    HISTORY AND PHYSICAL EXAMINATION            Date:   4/13/2024  Patient name:  Dianne Siegel  Date of admission:  4/12/2024  4:40 PM  MRN:   [id number]  Account:  [id number]  YOB: 1940  PCP:    Trae Bravo PA-C  Room:   [address]  Code Status:    Full Code      History Obtained From:     patient, electronic medical record    History of Present Illness:     Dianne Siegel is a 84 y.o. Non- / non  female who presents with Fatigue (Pt arrives with co fatigue and weakness which pt states has worsened today. Pt does have bone cancer x 4 years. )   and is admitted to the hospital for the management of Fatigue due to treatment.    This very pleasant 84-year-old female presented to hospital with generalized weakness, inability to ambulate.  In the emergency room laboratory analysis did  bleeding on exposed skin area  Extremities:  peripheral pulses palpable, no pedal edema or calf pain with palpation  Psych: normal affect     Investigations:      Laboratory Testing:  Recent Results (from the past 24 hour(s))   Urinalysis with Reflex to Culture    Collection Time: 04/12/24  5:44 PM    Specimen: Urine   Result Value Ref Range    Color, UA Yellow Yellow    Turbidity UA Clear Clear    Glucose, Ur NEGATIVE NEGATIVE mg/dL    Bilirubin Urine NEGATIVE NEGATIVE    Ketones, Urine NEGATIVE NEGATIVE mg/dL    Specific Gravity, UA 1.020 1.005 - 1.030    Urine Hgb NEGATIVE NEGATIVE    pH, UA 6.0 5.0 - 8.0    Protein, UA 2+ (A) NEGATIVE mg/dL    Urobilinogen, Urine Normal 0.0 - 1.0 EU/dL    Nitrite, Urine NEGATIVE NEGATIVE    Leukocyte Esterase, Urine NEGATIVE NEGATIVE   Microscopic Urinalysis    Collection Time: 04/12/24  5:44 PM   Result Value Ref Range    WBC, UA 0 TO 2 0 - 5 /HPF    RBC, UA 0 TO 2 0 - 2 /HPF    Epithelial Cells UA 2 TO 5 0 - 5 /HPF    Bacteria, UA FEW (A) None    Other Observations UA (A) NOT REQ.     Utilizing a urinalysis as the only screening method to exclude a potential uropathogen can be unreliable in many patient populations.  Rapid screening tests are less sensitive than culture and if UTI is a clinical possibility, culture should be considered despite a negative urinalysis.     EKG 12 Lead    Collection Time: 04/12/24  6:03 PM   Result Value Ref Range    Ventricular Rate 72 BPM    Atrial Rate 72 BPM    QRS Duration 180 ms    Q-T Interval 452 ms    QTc Calculation (Bazett) 494 ms    P Axis 67 degrees    R Axis -74 degrees    T Axis 92 degrees   EKG 12 Lead    Collection Time: 04/12/24  6:03 PM   Result Value Ref Range    Ventricular Rate 72 BPM    Atrial Rate 72 BPM    QRS Duration 180 ms    Q-T Interval 452 ms    QTc Calculation (Bazett) 494 ms    P Axis 67 degrees    R Axis -74 degrees    T Axis 92 degrees   COVID-19, Rapid    Collection Time: 04/12/24  6:19 PM    Specimen:

## 2024-04-13 NOTE — ED NOTES
Regarding rehab:  Family in contact with [facility].  Plan to use this facility for rehab after hospital admission.

## 2024-04-14 ENCOUNTER — APPOINTMENT (OUTPATIENT)
Dept: CT IMAGING | Age: 84
DRG: 643 | End: 2024-04-14
Payer: COMMERCIAL

## 2024-04-14 LAB
ANION GAP SERPL CALCULATED.3IONS-SCNC: 10 MMOL/L (ref 9–17)
ANION GAP SERPL CALCULATED.3IONS-SCNC: 11 MMOL/L (ref 9–17)
ANION GAP SERPL CALCULATED.3IONS-SCNC: 12 MMOL/L (ref 9–17)
BUN SERPL-MCNC: 15 MG/DL (ref 8–23)
CALCIUM SERPL-MCNC: 8.3 MG/DL (ref 8.6–10.4)
CHLORIDE SERPL-SCNC: 96 MMOL/L (ref 98–107)
CHLORIDE SERPL-SCNC: 97 MMOL/L (ref 98–107)
CHLORIDE SERPL-SCNC: 97 MMOL/L (ref 98–107)
CO2 SERPL-SCNC: 21 MMOL/L (ref 20–31)
CO2 SERPL-SCNC: 24 MMOL/L (ref 20–31)
CO2 SERPL-SCNC: 25 MMOL/L (ref 20–31)
CREAT SERPL-MCNC: 0.8 MG/DL (ref 0.5–0.9)
ERYTHROCYTE [DISTWIDTH] IN BLOOD BY AUTOMATED COUNT: 16.4 % (ref 12.5–15.4)
GFR SERPL CREATININE-BSD FRML MDRD: 72 ML/MIN/1.73M2
GLUCOSE SERPL-MCNC: 94 MG/DL (ref 70–99)
HCT VFR BLD AUTO: 40.6 % (ref 36–46)
HGB BLD-MCNC: 13.9 G/DL (ref 12–16)
MCH RBC QN AUTO: 32.7 PG (ref 26–34)
MCHC RBC AUTO-ENTMCNC: 34.3 G/DL (ref 31–37)
MCV RBC AUTO: 95.5 FL (ref 80–100)
OSMOLALITY SERPL: 270 MOSM/KG (ref 275–295)
PLATELET # BLD AUTO: 195 K/UL (ref 140–450)
PMV BLD AUTO: 6.8 FL (ref 6–12)
POTASSIUM SERPL-SCNC: 3.4 MMOL/L (ref 3.7–5.3)
POTASSIUM SERPL-SCNC: 3.4 MMOL/L (ref 3.7–5.3)
POTASSIUM SERPL-SCNC: 4.1 MMOL/L (ref 3.7–5.3)
PROCALCITONIN SERPL-MCNC: 0.22 NG/ML (ref 0–0.09)
RBC # BLD AUTO: 4.25 M/UL (ref 4–5.2)
SODIUM SERPL-SCNC: 129 MMOL/L (ref 135–144)
SODIUM SERPL-SCNC: 131 MMOL/L (ref 135–144)
SODIUM SERPL-SCNC: 133 MMOL/L (ref 135–144)
TROPONIN I SERPL HS-MCNC: 39 NG/L (ref 0–14)
WBC OTHER # BLD: 13.6 K/UL (ref 3.5–11)

## 2024-04-14 PROCEDURE — 6360000002 HC RX W HCPCS: Performed by: NURSE PRACTITIONER

## 2024-04-14 PROCEDURE — 85027 COMPLETE CBC AUTOMATED: CPT

## 2024-04-14 PROCEDURE — 84145 PROCALCITONIN (PCT): CPT

## 2024-04-14 PROCEDURE — 6370000000 HC RX 637 (ALT 250 FOR IP): Performed by: HOSPITALIST

## 2024-04-14 PROCEDURE — 99232 SBSQ HOSP IP/OBS MODERATE 35: CPT | Performed by: HOSPITALIST

## 2024-04-14 PROCEDURE — 71250 CT THORAX DX C-: CPT

## 2024-04-14 PROCEDURE — 2580000003 HC RX 258: Performed by: NURSE PRACTITIONER

## 2024-04-14 PROCEDURE — 1200000000 HC SEMI PRIVATE

## 2024-04-14 PROCEDURE — 80048 BASIC METABOLIC PNL TOTAL CA: CPT

## 2024-04-14 PROCEDURE — 36415 COLL VENOUS BLD VENIPUNCTURE: CPT

## 2024-04-14 PROCEDURE — 84484 ASSAY OF TROPONIN QUANT: CPT

## 2024-04-14 PROCEDURE — 80051 ELECTROLYTE PANEL: CPT

## 2024-04-14 RX ORDER — DOXYCYCLINE HYCLATE 100 MG
100 TABLET ORAL EVERY 12 HOURS SCHEDULED
Status: DISCONTINUED | OUTPATIENT
Start: 2024-04-14 | End: 2024-04-15

## 2024-04-14 RX ADMIN — DICYCLOMINE HYDROCHLORIDE 20 MG: 10 CAPSULE ORAL at 21:46

## 2024-04-14 RX ADMIN — DOXYCYCLINE HYCLATE 100 MG: 100 TABLET, COATED ORAL at 21:45

## 2024-04-14 RX ADMIN — SODIUM CHLORIDE, PRESERVATIVE FREE 10 ML: 5 INJECTION INTRAVENOUS at 21:47

## 2024-04-14 RX ADMIN — ACYCLOVIR 200 MG: 200 CAPSULE ORAL at 21:46

## 2024-04-14 RX ADMIN — SODIUM CHLORIDE, PRESERVATIVE FREE 10 ML: 5 INJECTION INTRAVENOUS at 09:05

## 2024-04-14 RX ADMIN — ENOXAPARIN SODIUM 40 MG: 100 INJECTION SUBCUTANEOUS at 09:05

## 2024-04-14 RX ADMIN — ACYCLOVIR 200 MG: 200 CAPSULE ORAL at 09:04

## 2024-04-14 RX ADMIN — LOSARTAN POTASSIUM 50 MG: 25 TABLET, FILM COATED ORAL at 09:05

## 2024-04-14 RX ADMIN — DOXYCYCLINE HYCLATE 100 MG: 100 TABLET, COATED ORAL at 09:05

## 2024-04-14 RX ADMIN — ASPIRIN 81 MG: 81 TABLET, COATED ORAL at 09:04

## 2024-04-14 RX ADMIN — AMLODIPINE BESYLATE 5 MG: 5 TABLET ORAL at 09:04

## 2024-04-14 ASSESSMENT — PAIN SCALES - GENERAL
PAINLEVEL_OUTOF10: 0
PAINLEVEL_OUTOF10: 0

## 2024-04-14 NOTE — PLAN OF CARE
Problem: Discharge Planning  Goal: Discharge to home or other facility with appropriate resources  4/14/2024 0832 by Debi Conroy RN  Outcome: Progressing  4/14/2024 0419 by Ang Andre RN  Outcome: Progressing  Flowsheets (Taken 4/13/2024 2000)  Discharge to home or other facility with appropriate resources:   Identify barriers to discharge with patient and caregiver   Arrange for needed discharge resources and transportation as appropriate   Identify discharge learning needs (meds, wound care, etc)     Problem: Safety - Adult  Goal: Free from fall injury  4/14/2024 0832 by Debi Conroy RN  Outcome: Progressing  4/14/2024 0419 by Ang Andre RN  Outcome: Progressing  Flowsheets (Taken 4/14/2024 0418)  Free From Fall Injury: Instruct family/caregiver on patient safety     Problem: ABCDS Injury Assessment  Goal: Absence of physical injury  4/14/2024 0832 by Debi Conroy RN  Outcome: Progressing  4/14/2024 0419 by Ang Andre RN  Outcome: Progressing  Flowsheets (Taken 4/14/2024 0418)  Absence of Physical Injury: Implement safety measures based on patient assessment

## 2024-04-14 NOTE — PLAN OF CARE
Problem: Discharge Planning  Goal: Discharge to home or other facility with appropriate resources  4/14/2024 0419 by Ang Andre, RN  Outcome: Progressing  Flowsheets (Taken 4/13/2024 2000)  Discharge to home or other facility with appropriate resources:   Identify barriers to discharge with patient and caregiver   Arrange for needed discharge resources and transportation as appropriate   Identify discharge learning needs (meds, wound care, etc)     Problem: Safety - Adult  Goal: Free from fall injury  4/14/2024 0419 by Ang Andre, RN  Outcome: Progressing  Flowsheets (Taken 4/14/2024 0418)  Free From Fall Injury: Instruct family/caregiver on patient safety     Problem: ABCDS Injury Assessment  Goal: Absence of physical injury  4/14/2024 0419 by Ang Andre, RN  Outcome: Progressing  Flowsheets (Taken 4/14/2024 0418)  Absence of Physical Injury: Implement safety measures based on patient assessment

## 2024-04-14 NOTE — PROGRESS NOTES
[facility]  Office: [phone]  Trae Melendez DO, Darshan Leach, DO, Michael Valdes DO, Bandar Duggan DO, Becca Urbano MD, Giana Flores MD, Shoaib Enciso MD, Madelin Rdz MD,  Ian Reyes MD, Latanya Soares MD, Diana Burgess MD,  Evangelina Bryson DO, Dana Churchill MD, Serge Ornelas MD, Tony Melendez DO, Sally Gilman MD,  Mikey Zhang DO, Chrissie Bhakta MD, Blank Rico MD, Joanne Aldridge MD, Kimberly Corcoran MD,  Robbie Gore MD, Chantal Scott MD, Levi Cornell MD, Sai Morejon MD, Holden Conway MD, Christine Zazueta MD, Quinn Sánchez DO, Donnell Baez DO, Maria Eugenia Mesa MD,  Aroldo Villanueva MD, Shirley Waterhouse, CNP,  Arleen Johnson CNP, Rosendo Gonzalez, CNP,  Arabella Rodas, BABS, Idalmis Villalpando, CNP, Leida Ko, CNP, Teetee Adam CNP, Lily Souza CNP, JESSICA BermudezC, JESSICA AlanizC, Valentine Eduardo, CNP, Tresa Gurrola, CNP, Tammie Mahan, CNP, Lulu Navarro, CNP, Debi Rodriguez, CNP, Nuria Michel, CNS, Hansa Butler, CNP, Sujata Matthews CNP, Tracy Schwab, CNP         [facility]   IN-PATIENT SERVICE   [facility]    Progress Note    4/14/2024    11:08 AM    Name:   Dianne Siegel  MRN:     [id number]     Acct:      [id number]   Room:   310/310-01   Day:  1  Admit Date:  4/12/2024  4:40 PM    PCP:   Trae Bravo PA-C  Code Status:  Full Code    Subjective:     Patient seen in follow-up for hyponatremia and fatigue, patient states \"I feel better\"    Patient states that she feels better.  She has undergone fluid resuscitation and her hyponatremia is improving.  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  I am going to discontinue her IV fluids at this point in time and place her on a fluid restriction.  If there is no significant improvement nephrology may be consulted.  I suspect her SIADH is secondary to chronic pain and may be related to her malignancy.    Regarding her multiple myeloma and her laboratory  distress  Mental Status:  oriented to person, place and time and normal affect  Lungs:  clear to auscultation bilaterally, normal effort  Heart:  regular rate and rhythm, no murmur  Abdomen:  soft, nontender, nondistended, normal bowel sounds, no masses, hepatomegaly, splenomegaly  Extremities:  no edema, redness, tenderness in the calves  Skin:  no gross lesions, rashes, induration    Assessment:     Hospital Problems             Last Modified POA    * (Principal) Fatigue due to treatment 4/12/2024 Yes    Hyponatremia 4/13/2024 Yes       Plan:     Weakness/fatigue  Possibly secondary to dehydration and multiple myeloma  PT/OT  Hyponatremia  Urine sodium 103  Consistent with SIADH  Likely secondary to chronic pain and possibly secondary to known malignancy  DC IV fluids, 1800ml fluid restriction  Multiple myeloma/leukocytosis  Consult oncology  Check CT chest to rule out occult process  Check procalcitonin  Empiric doxycycline  No clear source of infection, if CT scan and procalcitonin negative would recommend discontinuing antibiotics and monitoring off antibiotics  Elevated troponin  Troponins flat  Likely demand ischemia  Check echocardiogram  Essential hypertension  Continue Norvasc and Cozaar  Chronic pain  Continue Ultram  Dyslipidemia  Continue statin    Medical Decision Making: Craig Melendez DO  4/14/2024  11:08 AM

## 2024-04-15 LAB
ANION GAP SERPL CALCULATED.3IONS-SCNC: 10 MMOL/L (ref 9–17)
B PARAP IS1001 DNA NPH QL NAA+NON-PROBE: NOT DETECTED
B PERT DNA SPEC QL NAA+PROBE: NOT DETECTED
BASOPHILS # BLD: 0.04 K/UL (ref 0–0.2)
BASOPHILS NFR BLD: 0 % (ref 0–2)
BUN SERPL-MCNC: 13 MG/DL (ref 8–23)
C PNEUM DNA NPH QL NAA+NON-PROBE: NOT DETECTED
CALCIUM SERPL-MCNC: 8.6 MG/DL (ref 8.6–10.4)
CHLORIDE SERPL-SCNC: 99 MMOL/L (ref 98–107)
CO2 SERPL-SCNC: 24 MMOL/L (ref 20–31)
CREAT SERPL-MCNC: 0.7 MG/DL (ref 0.5–0.9)
EOSINOPHIL # BLD: 0.63 K/UL (ref 0–0.44)
EOSINOPHILS RELATIVE PERCENT: 6 % (ref 1–4)
ERYTHROCYTE [DISTWIDTH] IN BLOOD BY AUTOMATED COUNT: 15.5 % (ref 11.8–14.4)
FLUAV RNA NPH QL NAA+NON-PROBE: NOT DETECTED
FLUBV RNA NPH QL NAA+NON-PROBE: NOT DETECTED
GFR SERPL CREATININE-BSD FRML MDRD: 84 ML/MIN/1.73M2
GLUCOSE SERPL-MCNC: 118 MG/DL (ref 70–99)
HADV DNA NPH QL NAA+NON-PROBE: NOT DETECTED
HCOV 229E RNA NPH QL NAA+NON-PROBE: NOT DETECTED
HCOV HKU1 RNA NPH QL NAA+NON-PROBE: NOT DETECTED
HCOV NL63 RNA NPH QL NAA+NON-PROBE: NOT DETECTED
HCOV OC43 RNA NPH QL NAA+NON-PROBE: NOT DETECTED
HCT VFR BLD AUTO: 39 % (ref 36.3–47.1)
HGB BLD-MCNC: 13.4 G/DL (ref 11.9–15.1)
HMPV RNA NPH QL NAA+NON-PROBE: NOT DETECTED
HPIV1 RNA NPH QL NAA+NON-PROBE: NOT DETECTED
HPIV2 RNA NPH QL NAA+NON-PROBE: NOT DETECTED
HPIV3 RNA NPH QL NAA+NON-PROBE: NOT DETECTED
HPIV4 RNA NPH QL NAA+NON-PROBE: NOT DETECTED
IMM GRANULOCYTES # BLD AUTO: 0.15 K/UL (ref 0–0.3)
IMM GRANULOCYTES NFR BLD: 2 %
IMM RETICS NFR: 16.4 % (ref 2.7–18.3)
LYMPHOCYTES NFR BLD: 0.81 K/UL (ref 1.1–3.7)
LYMPHOCYTES RELATIVE PERCENT: 8 % (ref 24–43)
M PNEUMO DNA NPH QL NAA+NON-PROBE: NOT DETECTED
MCH RBC QN AUTO: 32.8 PG (ref 25.2–33.5)
MCHC RBC AUTO-ENTMCNC: 34.4 G/DL (ref 28.4–34.8)
MCV RBC AUTO: 95.6 FL (ref 82.6–102.9)
MONOCYTES NFR BLD: 0.76 K/UL (ref 0.1–1.2)
MONOCYTES NFR BLD: 8 % (ref 3–12)
NEUTROPHILS NFR BLD: 76 % (ref 36–65)
NEUTS SEG NFR BLD: 7.39 K/UL (ref 1.5–8.1)
NRBC BLD-RTO: 0 PER 100 WBC
PLATELET # BLD AUTO: 189 K/UL (ref 138–453)
PMV BLD AUTO: 9.6 FL (ref 8.1–13.5)
POTASSIUM SERPL-SCNC: 3.6 MMOL/L (ref 3.7–5.3)
RBC # BLD AUTO: 4.08 M/UL (ref 3.95–5.11)
RBC # BLD: ABNORMAL 10*6/UL
RETIC HEMOGLOBIN: 35.7 PG (ref 28.2–35.7)
RETICS # AUTO: 0.14 M/UL (ref 0.03–0.08)
RETICS/RBC NFR AUTO: 3.3 % (ref 0.5–1.9)
RSV RNA NPH QL NAA+NON-PROBE: NOT DETECTED
RV+EV RNA NPH QL NAA+NON-PROBE: NOT DETECTED
SARS-COV-2 RNA NPH QL NAA+NON-PROBE: NOT DETECTED
SODIUM SERPL-SCNC: 133 MMOL/L (ref 135–144)
SPECIMEN DESCRIPTION: NORMAL
WBC OTHER # BLD: 9.8 K/UL (ref 3.5–11.3)

## 2024-04-15 PROCEDURE — 85025 COMPLETE CBC W/AUTO DIFF WBC: CPT

## 2024-04-15 PROCEDURE — 6360000002 HC RX W HCPCS: Performed by: NURSE PRACTITIONER

## 2024-04-15 PROCEDURE — 36415 COLL VENOUS BLD VENIPUNCTURE: CPT

## 2024-04-15 PROCEDURE — 80048 BASIC METABOLIC PNL TOTAL CA: CPT

## 2024-04-15 PROCEDURE — 85045 AUTOMATED RETICULOCYTE COUNT: CPT

## 2024-04-15 PROCEDURE — 1200000000 HC SEMI PRIVATE

## 2024-04-15 PROCEDURE — 2580000003 HC RX 258: Performed by: NURSE PRACTITIONER

## 2024-04-15 PROCEDURE — 0202U NFCT DS 22 TRGT SARS-COV-2: CPT

## 2024-04-15 PROCEDURE — 97530 THERAPEUTIC ACTIVITIES: CPT

## 2024-04-15 PROCEDURE — 97535 SELF CARE MNGMENT TRAINING: CPT

## 2024-04-15 PROCEDURE — 6370000000 HC RX 637 (ALT 250 FOR IP): Performed by: HOSPITALIST

## 2024-04-15 PROCEDURE — 99232 SBSQ HOSP IP/OBS MODERATE 35: CPT | Performed by: HOSPITALIST

## 2024-04-15 PROCEDURE — 97116 GAIT TRAINING THERAPY: CPT

## 2024-04-15 RX ORDER — AZITHROMYCIN 250 MG/1
500 TABLET, FILM COATED ORAL DAILY
Status: COMPLETED | OUTPATIENT
Start: 2024-04-15 | End: 2024-04-15

## 2024-04-15 RX ORDER — AZITHROMYCIN 250 MG/1
250 TABLET, FILM COATED ORAL DAILY
Status: DISCONTINUED | OUTPATIENT
Start: 2024-04-16 | End: 2024-04-16 | Stop reason: HOSPADM

## 2024-04-15 RX ADMIN — SODIUM CHLORIDE, PRESERVATIVE FREE 10 ML: 5 INJECTION INTRAVENOUS at 08:38

## 2024-04-15 RX ADMIN — ACYCLOVIR 200 MG: 200 CAPSULE ORAL at 21:49

## 2024-04-15 RX ADMIN — ACYCLOVIR 200 MG: 200 CAPSULE ORAL at 08:38

## 2024-04-15 RX ADMIN — SODIUM CHLORIDE, PRESERVATIVE FREE 10 ML: 5 INJECTION INTRAVENOUS at 21:49

## 2024-04-15 RX ADMIN — DICYCLOMINE HYDROCHLORIDE 20 MG: 10 CAPSULE ORAL at 06:51

## 2024-04-15 RX ADMIN — LOSARTAN POTASSIUM 50 MG: 25 TABLET, FILM COATED ORAL at 08:38

## 2024-04-15 RX ADMIN — ASPIRIN 81 MG: 81 TABLET, COATED ORAL at 08:38

## 2024-04-15 RX ADMIN — ENOXAPARIN SODIUM 40 MG: 100 INJECTION SUBCUTANEOUS at 08:38

## 2024-04-15 RX ADMIN — AMLODIPINE BESYLATE 5 MG: 5 TABLET ORAL at 08:38

## 2024-04-15 RX ADMIN — AZITHROMYCIN DIHYDRATE 500 MG: 250 TABLET, FILM COATED ORAL at 08:38

## 2024-04-15 RX ADMIN — TRAMADOL HYDROCHLORIDE 50 MG: 50 TABLET, COATED ORAL at 13:00

## 2024-04-15 RX ADMIN — DICYCLOMINE HYDROCHLORIDE 20 MG: 10 CAPSULE ORAL at 21:49

## 2024-04-15 ASSESSMENT — PAIN DESCRIPTION - LOCATION: LOCATION: BUTTOCKS

## 2024-04-15 ASSESSMENT — PAIN DESCRIPTION - DESCRIPTORS: DESCRIPTORS: ACHING

## 2024-04-15 ASSESSMENT — PAIN SCALES - GENERAL
PAINLEVEL_OUTOF10: 7
PAINLEVEL_OUTOF10: 4

## 2024-04-15 ASSESSMENT — PAIN - FUNCTIONAL ASSESSMENT: PAIN_FUNCTIONAL_ASSESSMENT: ACTIVITIES ARE NOT PREVENTED

## 2024-04-15 NOTE — PROGRESS NOTES
[facility]  Office: [phone]  Trae Melendez DO, Darshan Leach, DO, Michael Valdes DO, Bandar Duggan DO, Becca Urbano MD, Giana Flores MD, Shoaib Enciso MD, Madelin Rdz MD,  Ian Reyes MD, Latanya Soares MD, Diana Burgess MD,  Evangelina Bryson DO, Dana Churchill MD, Serge Ornelas MD, Tony Melendez DO, Sally Gilman MD,  Mikey Zhang DO, Chrissie Bhakta MD, Blank Rico MD, Joanne Aldridge MD, Kimberly Corcoran MD,  Robbie Gore MD, Chantal Scott MD, Levi Cornell MD, Sai Morejon MD, Holden Conway MD, Christine Zazueta MD, Quinn Sánchez DO, Donnell Baez DO, Maria Eugenia Mesa MD,  Aroldo Villanueva MD, Shirley Waterhouse, CNP,  Arleen Johnson CNP, Rosendo Gonzalez, CNP,  Arabella Rodas, DNP, Idalmis Villalpando, CNP, Leida Ko, CNP, Teetee Adam, CNP, Lily Souza, CNP, Afshan Aranda PALuhC, Susie Frost PALuhC, Valentine Eduardo, CNP, Tresa Gurrola, CNP, Tammie Mahan, CNP, Lulu Navarro, CNP, Debi Rodriguez, CNP, Nuria Michel, CNS, Hansa Butler, CNP, Sujata Matthews CNP, Tracy Schwab, CNP         [facility]   IN-PATIENT SERVICE   [facility]    Progress Note    4/15/2024    11:59 AM    Name:   Dianne Siegel  MRN:     [id number]     Acct:      [id number]   Room:   310/310-01   Day:  2  Admit Date:  4/12/2024  4:40 PM    PCP:   Trae Bravo PA-C  Code Status:  Full Code    Subjective:     Patient seen in follow-up for shortness of breath, weakness, malaise.  Patient states \"I am feeling better\"    Patient states that she is feeling better.  Her sodium has improved and I did review the CT scan with her.  She does appear to have an atypical pneumonia.  Given the findings on CT scan I am going to transition her over to azithromycin for atypical pneumonia.  I will check a respiratory panel as well.     Oncology input noted and appreciated.  No inpatient interventions at this point in time.    PT/OT working with patient.  Working on disposition to skilled

## 2024-04-15 NOTE — PLAN OF CARE
Problem: Discharge Planning  Goal: Discharge to home or other facility with appropriate resources  4/15/2024 0353 by Lesia Can, RN  Outcome: Progressing     Problem: Safety - Adult  Goal: Free from fall injury  4/15/2024 0353 by Lesia Can, RN  Outcome: Progressing     Problem: ABCDS Injury Assessment  Goal: Absence of physical injury  4/15/2024 0353 by Lesia Can, RN  Outcome: Progressing

## 2024-04-15 NOTE — CARE COORDINATION
Rec'd called from [facility] [phone] son of patient is requesting referral sent to their facility, Referral sent.

## 2024-04-15 NOTE — CONSULTS
[facility] Hematology and Oncology - Consult Note  4/15/2024, 8:59 AM    Admit Date: 4/12/2024  Referring Physician: Tony Melendez DO   PCP: Trae Bravo PA-C    Reason for Consult: Multiple myeloma     History of Present Illness:   Dianne is an 83 yo female known to our service for her diagnosis of multiple myeloma for which she follows with Dr. Cotton. 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  FLC ratio began rising in October 2023, in January 2024 she started treatment with daratumumab.  FLC ratio most recently down to 7.13 (10.46) skeletal survey on 1/11/24 showed no evidence of metastatic disease. Patient presented to the ED on 4/13 with complaints of fatigue and weakness.       Patient seen and examined at bedside, family present.  She appears frail and weak.  Family voices concern that she has not been out of bed and is not eating well.  Discussed plan with patient and family who voiced understanding.  Patient is currently afebrile.  She does have a cough.  She is maintained on room air.  No distress noted.        Impression/Plan    Multiple myeloma   -Follows with Dr. Cotton   -Currently on treatment with Daratumumab, last received 4/9   -FLC ratio on March 25 was down to 7.13 from 10.46 in February  -Skeletal survey completed in January showed no evidence of metastatic disease  -Continue prophylactic acyclovir  -No plan for inpatient oncologic treatment at this time   -Will remain off treatment while in rehab facility  -Follow up with Dr. Cotton once acute health issues resolve     Pneumonia  -CT showed Areas of abnormal patchy ground-glass opacity bilaterally, consider an  atypical or viral infectious process.  -On oral Zithromax  -As per services    Leukocytosis  -Likely reactive to acute infection, steroids, cannot fully exclude other etiology at this time  -Mild.  With relative neutrophilia.   -Patient on steroids with treatment,

## 2024-04-15 NOTE — PROGRESS NOTES
Physical Therapy  Facility/Department: [address]  Physical Therapy Daily Treatment Note    Name: Dianne Siegel  : 1940  MRN: [id number]  Date of Service: 4/15/2024    Discharge Recommendations:  Patient would benefit from continued therapy after discharge, Patient able to tolerate 3hrs of therapy a day          Patient Diagnosis(es): The primary encounter diagnosis was Fatigue, unspecified type. A diagnosis of Elevated troponin was also pertinent to this visit.  Past Medical History:  has a past medical history of Cancer (HCC).  Past Surgical History:  has no past surgical history on file.    Assessment   Body Structures, Functions, Activity Limitations Requiring Skilled Therapeutic Intervention: Decreased safe awareness;Decreased endurance;Decreased balance;Decreased strength;Decreased functional mobility     Assessment: Pt being seen for strength, mobility and safety deficits. Pt lives with spouse and adult son who has special needs but able to sometimes help. Prior level of function includes being independent in ambulation with 4 WW for household ambulation and used  as support for community ambulation . Pt currently reqiring min assist to roll in bed, mod for supine to sit, mod x 2 sit/stand from edge of bed and mod x 1 for transfers on commode. Pt required mod assist x 2 with rolling walker 3ft bed to BSC and mod assist x 1 for 4ft with rolling walker BSC to recliner. Pt with consistent posterior lean. Pt is deemed unsafe to return to prior living environment. Pt would benefit from continued therapy to work on functional mobility skills, strengthening, gait training, balance retraining and endurance training while in the hospital and at discharge.    Further therapy recommended at discharge.The patient should be able to tolerate at least 3 hours of therapy per day over 5 days or 15 hours over 7 days.   This patient may benefit from a Physical Medicine and Rehab consult.      Requires PT  in hospital room?: Total  How much help is needed climbing 3-5 steps with a railing?: Total  AM-PAC Inpatient Mobility Raw Score : 10  AM-PAC Inpatient T-Scale Score : 32.29  Mobility Inpatient CMS 0-100% Score: 76.75  Mobility Inpatient CMS G-Code Modifier : CL                Goals  Short Term Goals  Time Frame for Short Term Goals: 14 days  Short Term Goal 1: Modified independent in bed mobility.  Short Term Goal 2: Modified independent in transfers with RW.  Short Term Goal 3: Modified independent in ambulation for at least 150 ft with RW.  Short Term Goal 4: Pt to tolerate therapy session for at least 30 minutes with 1-2 rest breaks.       Education  Patient Education  Education Given To: Patient  Education Provided: Role of Therapy;Plan of Care;Transfer Training;Fall Prevention Strategies;Equipment  Education Method: Demonstration;Verbal  Barriers to Learning: Cognition  Education Outcome: Verbalized understanding;Continued education needed;Demonstrated understanding      Therapy Time   Individual Concurrent Group Co-treatment   Time In 1405         Time Out 1432         Minutes 27         Timed Code Treatment Minutes: 25 Minutes       NEDA DURAN, PT

## 2024-04-15 NOTE — PROGRESS NOTES
Occupational Therapy  Facility/Department: [address]  Occupational Therapy Daily Treatment Note      Name: Dianne Siegel  : 1940  MRN: [id number]  Date of Service: 4/15/2024    Discharge Recommendations:  Patient would benefit from continued therapy after discharge  OT Equipment Recommendations  Other: TBD       Patient Diagnosis(es): The primary encounter diagnosis was Fatigue, unspecified type. A diagnosis of Elevated troponin was also pertinent to this visit.  Past Medical History:  has a past medical history of Cancer (HCC).  Past Surgical History:  has no past surgical history on file.           Assessment   Performance deficits / Impairments: Decreased functional mobility ;Decreased ADL status;Decreased ROM;Decreased strength;Decreased safe awareness;Decreased cognition;Decreased fine motor control;Decreased high-level IADLs;Decreased balance;Decreased endurance;Decreased coordination  Assessment: Pt presents with decreased ADL status secondary to above noted deficits, most significantly decreased activity tolerance/strength/balance. Pt to benefit from continued therapy services while hospitalized to maximize pt's safety and independence in performing functional tasks. At this time, pt has not demonstrated the functional ability to safely return to prior living arrangements, continued skilled OT intervention recommended prior to an eventual return to home.  Prognosis: Good  REQUIRES OT FOLLOW-UP: Yes  Activity Tolerance  Activity Tolerance: Patient limited by fatigue        Plan   Occupational Therapy Plan  Times Per Week: 5-6x/wk  Current Treatment Recommendations: Balance training, Strengthening, Functional mobility training, Endurance training, ROM, Safety education & training, Patient/Caregiver education & training, Equipment evaluation, education, & procurement, Self-Care / ADL, Home management training, Coordination training, Neuromuscular re-education  5: Pt will perform toileting/LB ADL tasks with CGA using AE/DME PRN       Therapy Time   Individual Concurrent Group Co-treatment   Time In 1505         Time Out 1535         Minutes 30         Timed Code Treatment Minutes: 30 Minutes       DHARA ALVARADO OT

## 2024-04-15 NOTE — PLAN OF CARE
Problem: Discharge Planning  Goal: Discharge to home or other facility with appropriate resources  4/15/2024 1125 by Debi Conroy RN  Outcome: Progressing  4/15/2024 0353 by Lesia Can RN  Outcome: Progressing  4/15/2024 0329 by Lesia Can RN  Outcome: Progressing     Problem: Safety - Adult  Goal: Free from fall injury  4/15/2024 1125 by Debi Conroy RN  Outcome: Progressing  4/15/2024 0353 by Lesia Can RN  Outcome: Progressing  4/15/2024 0329 by Lesia Can RN  Outcome: Progressing     Problem: ABCDS Injury Assessment  Goal: Absence of physical injury  4/15/2024 1125 by Debi Conroy RN  Outcome: Progressing  4/15/2024 0353 by Lesia Can RN  Outcome: Progressing  4/15/2024 0329 by Lesia Can RN  Outcome: Progressing

## 2024-04-16 ENCOUNTER — APPOINTMENT (OUTPATIENT)
Age: 84
DRG: 643 | End: 2024-04-16
Attending: HOSPITALIST
Payer: COMMERCIAL

## 2024-04-16 VITALS
HEIGHT: 64 IN | WEIGHT: 105 LBS | DIASTOLIC BLOOD PRESSURE: 81 MMHG | HEART RATE: 97 BPM | OXYGEN SATURATION: 90 % | SYSTOLIC BLOOD PRESSURE: 152 MMHG | BODY MASS INDEX: 17.93 KG/M2 | RESPIRATION RATE: 16 BRPM | TEMPERATURE: 98 F

## 2024-04-16 PROBLEM — C90.00 MULTIPLE MYELOMA (HCC): Status: ACTIVE | Noted: 2021-11-09

## 2024-04-16 PROBLEM — I10 HYPERTENSION: Status: ACTIVE | Noted: 2020-12-08

## 2024-04-16 PROBLEM — Z95.0 HISTORY OF PACEMAKER: Status: ACTIVE | Noted: 2020-12-08

## 2024-04-16 PROBLEM — E78.5 HYPERLIPIDEMIA: Status: ACTIVE | Noted: 2022-10-06

## 2024-04-16 LAB
ANION GAP SERPL CALCULATED.3IONS-SCNC: 9 MMOL/L (ref 9–17)
BUN SERPL-MCNC: 18 MG/DL (ref 8–23)
CALCIUM SERPL-MCNC: 8.9 MG/DL (ref 8.6–10.4)
CHLORIDE SERPL-SCNC: 101 MMOL/L (ref 98–107)
CO2 SERPL-SCNC: 23 MMOL/L (ref 20–31)
CREAT SERPL-MCNC: 0.8 MG/DL (ref 0.5–0.9)
ECHO AO ROOT DIAM: 2.9 CM
ECHO AO ROOT INDEX: 1.95 CM/M2
ECHO AV MEAN GRADIENT: 5 MMHG
ECHO AV MEAN VELOCITY: 1.1 M/S
ECHO AV PEAK GRADIENT: 9 MMHG
ECHO AV PEAK VELOCITY: 1.5 M/S
ECHO AV VELOCITY RATIO: 0.6
ECHO AV VTI: 27.7 CM
ECHO BSA: 1.47 M2
ECHO EST RA PRESSURE: 3 MMHG
ECHO IVC EXP: 1.1 CM
ECHO IVC INSP: 0.5 CM
ECHO LA AREA 2C: 17 CM2
ECHO LA AREA 4C: 15.2 CM2
ECHO LA DIAMETER INDEX: 2.62 CM/M2
ECHO LA DIAMETER: 3.9 CM
ECHO LA MAJOR AXIS: 5.2 CM
ECHO LA MINOR AXIS: 5.2 CM
ECHO LA TO AORTIC ROOT RATIO: 1.34
ECHO LA VOL BP: 40 ML (ref 22–52)
ECHO LA VOL MOD A2C: 47 ML (ref 22–52)
ECHO LA VOL MOD A4C: 35 ML (ref 22–52)
ECHO LA VOL/BSA BIPLANE: 27 ML/M2 (ref 16–34)
ECHO LA VOLUME INDEX MOD A2C: 32 ML/M2 (ref 16–34)
ECHO LA VOLUME INDEX MOD A4C: 23 ML/M2 (ref 16–34)
ECHO LV E' LATERAL VELOCITY: 6 CM/S
ECHO LV E' SEPTAL VELOCITY: 5 CM/S
ECHO LV EDV A2C: 89 ML
ECHO LV EDV A4C: 82 ML
ECHO LV EDV INDEX A4C: 55 ML/M2
ECHO LV EDV NDEX A2C: 60 ML/M2
ECHO LV EJECTION FRACTION A2C: 52 %
ECHO LV EJECTION FRACTION A4C: 45 %
ECHO LV EJECTION FRACTION BIPLANE: 47 % (ref 55–100)
ECHO LV ESV A2C: 43 ML
ECHO LV ESV A4C: 45 ML
ECHO LV ESV INDEX A2C: 29 ML/M2
ECHO LV ESV INDEX A4C: 30 ML/M2
ECHO LV FRACTIONAL SHORTENING: 24 % (ref 28–44)
ECHO LV INTERNAL DIMENSION DIASTOLE INDEX: 2.82 CM/M2
ECHO LV INTERNAL DIMENSION DIASTOLIC: 4.2 CM (ref 3.9–5.3)
ECHO LV INTERNAL DIMENSION SYSTOLIC INDEX: 2.15 CM/M2
ECHO LV INTERNAL DIMENSION SYSTOLIC: 3.2 CM
ECHO LV IVSD: 1.2 CM (ref 0.6–0.9)
ECHO LV MASS 2D: 167.4 G (ref 67–162)
ECHO LV MASS INDEX 2D: 112.4 G/M2 (ref 43–95)
ECHO LV POSTERIOR WALL DIASTOLIC: 1.1 CM (ref 0.6–0.9)
ECHO LV RELATIVE WALL THICKNESS RATIO: 0.52
ECHO LVOT AREA: 3.1 CM2
ECHO LVOT AV VTI INDEX: 0.65
ECHO LVOT DIAM: 2 CM
ECHO LVOT MEAN GRADIENT: 2 MMHG
ECHO LVOT PEAK GRADIENT: 3 MMHG
ECHO LVOT PEAK VELOCITY: 0.9 M/S
ECHO LVOT STROKE VOLUME INDEX: 37.9 ML/M2
ECHO LVOT SV: 56.5 ML
ECHO LVOT VTI: 18 CM
ECHO MV A VELOCITY: 0.98 M/S
ECHO MV E VELOCITY: 0.9 M/S
ECHO MV E/A RATIO: 0.92
ECHO MV E/E' LATERAL: 15
ECHO MV E/E' RATIO (AVERAGED): 16.5
ECHO RIGHT VENTRICULAR SYSTOLIC PRESSURE (RVSP): 39 MMHG
ECHO RV BASAL DIMENSION: 4.1 CM
ECHO RV FREE WALL PEAK S': 13 CM/S
ECHO TV REGURGITANT MAX VELOCITY: 3.01 M/S
ECHO TV REGURGITANT PEAK GRADIENT: 36 MMHG
GFR SERPL CREATININE-BSD FRML MDRD: 72 ML/MIN/1.73M2
GLUCOSE SERPL-MCNC: 125 MG/DL (ref 70–99)
PATH REV BLD -IMP: NORMAL
POTASSIUM SERPL-SCNC: 3.6 MMOL/L (ref 3.7–5.3)
SODIUM SERPL-SCNC: 133 MMOL/L (ref 135–144)
SURGICAL PATHOLOGY REPORT: NORMAL

## 2024-04-16 PROCEDURE — 80048 BASIC METABOLIC PNL TOTAL CA: CPT

## 2024-04-16 PROCEDURE — 93306 TTE W/DOPPLER COMPLETE: CPT

## 2024-04-16 PROCEDURE — 97535 SELF CARE MNGMENT TRAINING: CPT

## 2024-04-16 PROCEDURE — 92610 EVALUATE SWALLOWING FUNCTION: CPT

## 2024-04-16 PROCEDURE — 36415 COLL VENOUS BLD VENIPUNCTURE: CPT

## 2024-04-16 PROCEDURE — 6360000002 HC RX W HCPCS: Performed by: NURSE PRACTITIONER

## 2024-04-16 PROCEDURE — 2580000003 HC RX 258: Performed by: NURSE PRACTITIONER

## 2024-04-16 PROCEDURE — 99238 HOSP IP/OBS DSCHRG MGMT 30/<: CPT | Performed by: STUDENT IN AN ORGANIZED HEALTH CARE EDUCATION/TRAINING PROGRAM

## 2024-04-16 PROCEDURE — 97530 THERAPEUTIC ACTIVITIES: CPT

## 2024-04-16 PROCEDURE — 6370000000 HC RX 637 (ALT 250 FOR IP): Performed by: HOSPITALIST

## 2024-04-16 PROCEDURE — 6370000000 HC RX 637 (ALT 250 FOR IP): Performed by: STUDENT IN AN ORGANIZED HEALTH CARE EDUCATION/TRAINING PROGRAM

## 2024-04-16 PROCEDURE — 93306 TTE W/DOPPLER COMPLETE: CPT | Performed by: INTERNAL MEDICINE

## 2024-04-16 RX ORDER — DOXYCYCLINE HYCLATE 100 MG
100 TABLET ORAL 2 TIMES DAILY
Qty: 14 TABLET | Refills: 0 | DISCHARGE
Start: 2024-04-16 | End: 2024-04-23

## 2024-04-16 RX ORDER — PREDNISONE 20 MG/1
40 TABLET ORAL DAILY
Qty: 20 TABLET | Refills: 0 | DISCHARGE
Start: 2024-04-16 | End: 2024-04-26

## 2024-04-16 RX ORDER — PREDNISONE 20 MG/1
40 TABLET ORAL DAILY
Status: DISCONTINUED | OUTPATIENT
Start: 2024-04-16 | End: 2024-04-16 | Stop reason: HOSPADM

## 2024-04-16 RX ADMIN — DICYCLOMINE HYDROCHLORIDE 20 MG: 10 CAPSULE ORAL at 06:13

## 2024-04-16 RX ADMIN — SODIUM CHLORIDE, PRESERVATIVE FREE 10 ML: 5 INJECTION INTRAVENOUS at 09:25

## 2024-04-16 RX ADMIN — ACYCLOVIR 200 MG: 200 CAPSULE ORAL at 09:25

## 2024-04-16 RX ADMIN — ENOXAPARIN SODIUM 40 MG: 100 INJECTION SUBCUTANEOUS at 09:25

## 2024-04-16 RX ADMIN — AMLODIPINE BESYLATE 5 MG: 5 TABLET ORAL at 09:25

## 2024-04-16 RX ADMIN — AZITHROMYCIN DIHYDRATE 250 MG: 250 TABLET, FILM COATED ORAL at 09:25

## 2024-04-16 RX ADMIN — ASPIRIN 81 MG: 81 TABLET, COATED ORAL at 09:25

## 2024-04-16 RX ADMIN — CYANOCOBALAMIN TAB 500 MCG 1000 MCG: 500 TAB at 09:25

## 2024-04-16 RX ADMIN — PREDNISONE 40 MG: 20 TABLET ORAL at 12:17

## 2024-04-16 RX ADMIN — ROSUVASTATIN CALCIUM 5 MG: 5 TABLET, FILM COATED ORAL at 09:25

## 2024-04-16 RX ADMIN — LOSARTAN POTASSIUM 50 MG: 25 TABLET, FILM COATED ORAL at 09:25

## 2024-04-16 RX ADMIN — DICYCLOMINE HYDROCHLORIDE 20 MG: 10 CAPSULE ORAL at 09:25

## 2024-04-16 NOTE — PLAN OF CARE
Problem: Discharge Planning  Goal: Discharge to home or other facility with appropriate resources  Outcome: Adequate for Discharge     Problem: Safety - Adult  Goal: Free from fall injury  Outcome: Adequate for Discharge     Problem: ABCDS Injury Assessment  Goal: Absence of physical injury  Outcome: Adequate for Discharge

## 2024-04-16 NOTE — DISCHARGE SUMMARY
[facility]  Office: [phone]  Trae Melendez DO, Darshan Leach DO, Michael Valdes DO, Bandar Duggan DO, Becca Urbano MD, Giana Flores MD, Shoaib Enciso MD, Madelin Rdz MD,  Ian Reyes MD, Latanya Soares MD, Diana Burgess MD,  Evangelina Bryson DO, Dana Churchill MD, Serge Orenlas MD, Tony Melendez DO, Sally Gilman MD,  Mikey Zhang DO, Chrissie Bhakta MD, Blank Rico MD, Joanne Aldridge MD, Kimberly Corcoran MD,  Robbie Gore MD, Chantal Scott MD, Levi Cornell MD, Sai Morejon MD, Holden Conway MD, Christine Zazueta MD, Quinn Sánchez DO, Donnell Baez DO, Maria Eugenia Mesa MD,  Aroldo Villanueva MD, Shirley Waterhouse, CNP,  Arleen Johnson CNP, Rosendo Gonzalez, CNP,  Arabella Rodas, DNP, Idalmis Villalpando, CNP, Leida Ko, CNP, Teetee Adam, CNP, Lily Souza, CNP, Afshan Aranda PALuhC, Susie Frost PA-C, Valentine Eduardo, CNP, Tresa Gurrola, CNP, Tammie Mahan, CNP, Lulu Navarro, CNP, Debi Rodriguez, CNP, Nuria Michel, CNS, Hansa Butler, CNP, Sujata Matthews CNP, Tracy Schwab, CNP         [facility]   IN-PATIENT SERVICE   [facility]    Discharge Summary     Patient ID: Dianne Siegel  :  1940   MRN: [id number]     ACCOUNT:  [id number]   Patient's PCP: Trae Bravo PA-C  Admit Date: 2024   Discharge Date: 2024  Length of Stay: 3  Code Status:  Full Code  Admitting Physician: Tony Melendez DO  Discharge Physician: Serge Ornelas MD     Active Discharge Diagnoses:     Hospital Problem Lists:  Principal Problem:    Fatigue due to treatment  Active Problems:    Hyponatremia    Hyperlipidemia    Hypertension    Multiple myeloma (HCC)  Resolved Problems:    * No resolved hospital problems. *      Admission Condition:  fair     Discharged Condition: good    Hospital Stay:     Hospital Course:  Dianne Siegel is an 84-year-old female with a past medical history of multiple myeloma (currently on chemotherapy), hypertension and  \"NBEA\", \"PBEA\", \"BEART\", \"BE\", \"THGBART\", \"THB\", \"SJI8MWZ\", \"ECYS2HWU\", \"D9ETQDOU\", \"O2SAT\", \"FIO2\"  No results found for: \"SPECIAL\"  No results found for: \"CULTURE\"    Radiology:  CT CHEST WO CONTRAST    Result Date: 4/14/2024  Areas of abnormal patchy ground-glass opacity bilaterally, consider an atypical or viral infectious process. Right middle lobe and lingular bronchiectasis and tree-in-bud type infiltrate as can be seen in atypical mycobacterial infection. Some of these findings may be chronic as there are no prior CTs of the chest available for comparison. Consider follow-up CT in 3-6 months. Cardiomegaly and small pericardial effusion.     XR CHEST PORTABLE    Result Date: 4/12/2024  No acute cardiopulmonary disease       Consultations:    Consults:     Final Specialist Recommendations/Findings:   IP CONSULT TO SOCIAL WORK  IP CONSULT TO ONCOLOGY      The patient was seen and examined on day of discharge and this discharge summary is in conjunction with any daily progress note from day of discharge.    Discharge plan:     Disposition: To a non-[facility] facility    Physician Follow Up:   [place] [address]  [phone]           Requiring Further Evaluation/Follow Up POST HOSPITALIZATION/Incidental Findings: None.     Diet: regular diet    Activity: As tolerated    Instructions to Patient: Take doxycycline and prednisone as prescribed. Follow-up with your primary care physician as needed.     Discharge Medications:      Medication List        START taking these medications      doxycycline hyclate 100 MG tablet  Commonly known as: VIBRA-TABS  Take 1 tablet by mouth 2 times daily for 7 days     predniSONE 20 MG tablet  Commonly known as: DELTASONE  Take 2 tablets by mouth daily for 10 days            CONTINUE taking these medications      acyclovir 200 MG capsule  Commonly known as: ZOVIRAX     amLODIPine 5 MG tablet  Commonly known as: NORVASC     aspirin 81 MG EC tablet

## 2024-04-16 NOTE — PROGRESS NOTES
Occupational Therapy  Facility/Department: [address]  Occupational Therapy Initial Assessment    Name: Dianne Siegel  : 1940  MRN: [id number]  Date of Service: 2024    Discharge Recommendations:  Patient would benefit from continued therapy after discharge  OT Equipment Recommendations  Other: TBD     Patient Diagnosis(es): The primary encounter diagnosis was Fatigue, unspecified type. A diagnosis of Elevated troponin was also pertinent to this visit.  Past Medical History:  has a past medical history of Cancer (HCC).  Past Surgical History:  has no past surgical history on file.    Assessment   Performance deficits / Impairments: Decreased functional mobility ;Decreased ADL status;Decreased ROM;Decreased strength;Decreased safe awareness;Decreased cognition;Decreased fine motor control;Decreased high-level IADLs;Decreased balance;Decreased endurance;Decreased coordination    Assessment: Pt presents with decreased ADL status secondary to above noted deficits, most significantly decreased activity tolerance/strength/balance. Pt to benefit from continued therapy services while hospitalized to maximize pt's safety and independence in performing functional tasks. At this time, pt has not demonstrated the functional ability to safely return to prior living arrangements, continued skilled OT intervention recommended prior to an eventual return to home.  Prognosis: Good    REQUIRES OT FOLLOW-UP: Yes  Activity Tolerance  Activity Tolerance: Patient limited by fatigue        Plan   Occupational Therapy Plan  Times Per Week: 5-6x/wk  Current Treatment Recommendations: Balance training, Strengthening, Functional mobility training, Endurance training, ROM, Safety education & training, Patient/Caregiver education & training, Equipment evaluation, education, & procurement, Self-Care / ADL, Home management training, Coordination training, Neuromuscular re-education  assistance  Stand to sit: Moderate assistance  Transfer Comments: Mod verbal/tactile cues for proper hand placement/sequencing/safety awareness with use of RW; increased time/effort to perform    Education Given To: Patient  Education Provided: Transfer Training;Equipment;Fall Prevention Strategies;ADL Adaptive Strategies;Energy Conservation  Education Method: Demonstration;Verbal  Barriers to Learning: None  Education Outcome: Verbalized understanding;Continued education needed    AM-PAC - ADL  AM-PAC Daily Activity - Inpatient   How much help is needed for putting on and taking off regular lower body clothing?: A Lot  How much help is needed for bathing (which includes washing, rinsing, drying)?: A Lot  How much help is needed for toileting (which includes using toilet, bedpan, or urinal)?: A Lot  How much help is needed for putting on and taking off regular upper body clothing?: A Lot  How much help is needed for taking care of personal grooming?: A Lot  How much help for eating meals?: A Little  AM-[facility] Inpatient Daily Activity Raw Score: 13  AM-PAC Inpatient ADL T-Scale Score : 32.03  ADL Inpatient CMS 0-100% Score: 63.03  ADL Inpatient CMS G-Code Modifier : CL    Goals  Short Term Goals  Time Frame for Short Term Goals: 14 visits  Short Term Goal 1: Pt will perform grooming/UB ADL tasks with mod IND using AE/DME PRN  Short Term Goal 2: Pt will perform functional transfers/functional mobility with mod IND using LRAD  Short Term Goal 3: Pt will independently demo good safety awareness during engagement in all ADLs and functional transfers/functional mobility  Short Term Goal 4: Pt will demo 8+ minutes tolerance to static/dynamic standing tasks for increased ADL participation  Short Term Goal 5: Pt will perform toileting/LB ADL tasks with CGA using AE/DME PRN    Therapy Time   Individual Concurrent Group Co-treatment   Time In 1055         Time Out 1133         Minutes 38         Timed Code Treatment Minutes: 38

## 2024-04-16 NOTE — PROGRESS NOTES
SLP ALL NOTES  Facility/Department: [address]   CLINICAL BEDSIDE SWALLOW EVALUATION    NAME: Dianne Siegel  : 1940  MRN: [id number]    ADMISSION DATE: 2024  ADMITTING DIAGNOSIS: has Acute pancreatitis without infection or necrosis; Fatigue due to treatment; Hyponatremia; History of pacemaker; Hyperlipidemia; Hypertension; and Multiple myeloma (HCC) on their problem list.    Date of Eval: 2024  Evaluating Therapist: AMIRAH SANCHEZ    Current Diet level:  Current Diet : Regular  Current Liquid Diet : Thin    Primary Complaint  Dianne Siegel is a 84 y.o. Non- / non  female who presents with Fatigue (Pt arrives with co fatigue and weakness which pt states has worsened today. Pt does have bone cancer x 4 years. )   and is admitted to the hospital for the management of Fatigue due to treatment.     This very pleasant 84-year-old female presented to hospital with generalized weakness, inability to ambulate.  In the emergency room laboratory analysis did not reveal a urinary tract infection.  Chest x-ray does not show abnormal process.  The patient has been found to be hyponatremic.  The patient is undergoing treatment for multiple myeloma.  She is somewhat evasive as to whether or not she has been eating enough or drinking enough.  I do have suspicion that the patient may have poor oral intake leading to a decrease solute intake.  I am going to check a urine osmolality, serum osmolality, urine sodium along with renal ultrasound.  Gentle fluids have been initiated and I am going to start dietary supplementation.  Further decision making regarding her hyponatremia will be made as her clinical scenario evolves.  She does not appear to be on any medications that typically cause hyponatremia.  Meanwhile we will obtain PT/OT evaluations for recommendations and discharge.  This is all been discussed with patient and she denies any questions or concerns at this point in time.  RN

## 2024-04-16 NOTE — DISCHARGE INSTR - COC
Continuity of Care Form    Patient Name: Dianne Chang   :  1940  MRN:  [id number]    Admit date:  2024  Discharge date:  24    Code Status Order: Full Code   Advance Directives:     Admitting Physician:  Tony Melendez DO  PCP: Trae Bravo PA-C    Discharging Nurse: EWELINA Rowe  Discharging Hospital Unit/Room#: 310/310-01  Discharging Unit Phone Number: [phone]    Emergency Contact:   Extended Emergency Contact Information  Primary Emergency Contact: MELISSA CHANG  Home Phone: [phone]  Relation: Spouse    Past Surgical History:  History reviewed. No pertinent surgical history.    Immunization History:   Immunization History   Administered Date(s) Administered    COVID-19, [place] Vaccine, Vaccine Unspecified 2021, 02/15/2021, 2021       Active Problems:  Patient Active Problem List   Diagnosis Code    Acute pancreatitis without infection or necrosis K85.90    Fatigue due to treatment R53.83    Hyponatremia E87.1    History of pacemaker Z95.0    Hyperlipidemia E78.5    Hypertension I10    Multiple myeloma (HCC) C90.00       Isolation/Infection:   Isolation            No Isolation          Patient Infection Status       None to display                     Nurse Assessment:  Last Vital Signs: BP (!) 142/70   Pulse 92   Temp 98.1 °F (36.7 °C) (Oral)   Resp 16   Ht 1.626 m (5' 4\")   Wt 48 kg (105 lb 13.1 oz)   SpO2 91%   BMI 18.16 kg/m²     Last documented pain score (0-10 scale): Pain Level: 4  Last Weight:   Wt Readings from Last 1 Encounters:   04/15/24 48 kg (105 lb 13.1 oz)     Mental Status:  oriented and alert    IV Access:  - None    Nursing Mobility/ADLs:  Walking   Assisted  Transfer  Assisted  Bathing  Assisted  Dressing  Assisted  Toileting  Assisted  Feeding  Independent  Med Admin  Assisted  Med Delivery   whole    Wound Care Documentation and Therapy:        Elimination:  Continence:   Bowel: Yes  Bladder: No  Urinary Catheter: None    Colostomy/Ileostomy/Ileal Conduit: No       Date of Last BM: 4/14/24    Intake/Output Summary (Last 24 hours) at 4/16/2024 0554  Last data filed at 4/16/2024 0357  Gross per 24 hour   Intake --   Output 650 ml   Net -650 ml     I/O last 3 completed shifts:  In: 240 [P.O.:240]  Out: 400 [Urine:400]    Safety Concerns:     At Risk for Falls    Impairments/Disabilities:      Hearing    Nutrition Therapy:  Current Nutrition Therapy:   - Oral Diet:  General    Routes of Feeding: Oral; high calorie/high protein supplement breakfast, lunch, and dinner.  Liquids: No Restrictions  Daily Fluid Restriction: yes - amount 1800 mL  Last Modified Barium Swallow with Video (Video Swallowing Test): not done    Treatments at the Time of Hospital Discharge:   Respiratory Treatments: N/a  Oxygen Therapy:  is not on home oxygen therapy.  Ventilator:    - No ventilator support    Rehab Therapies: Physical Therapy and Occupational Therapy  Weight Bearing Status/Restrictions: No weight bearing restrictions  Other Medical Equipment (for information only, NOT a DME order):  wheelchair and walker  Other Treatments: N/a    Patient's personal belongings (please select all that are sent with patient):  None    RN SIGNATURE:  Electronically signed by Soledad Austin RN on 4/16/24 at 11:46 AM EDT    CASE MANAGEMENT/SOCIAL WORK SECTION    Inpatient Status Date: 4/13/2024    Readmission Risk Assessment Score:  Readmission Risk              Risk of Unplanned Readmission:  16           Discharging to Facility/ Agency   Name: [address] [address]     Dialysis Facility (if applicable)   Name:  Address:  Dialysis Schedule:  Phone:  Fax:    / signature: Electronically signed by Aide Moreira RN on 4/16/24 at 11:30 AM EDT    PHYSICIAN SECTION    Prognosis: Fair    Condition at Discharge: Stable    Rehab Potential (if transferring to Rehab): Fair    Recommended Labs or

## 2024-04-16 NOTE — PROGRESS NOTES
[facility] - Daily Progress Note  2024, 8:44 AM    Admit Date: 2024  PCP: Trae Bravo, ELISHA    Impression/Plan:   Multiple Myeloma  -Patient of Dr. Cotton, with Stage III by ISS kappa light chain myeloma  -Currently on systemic therapy with daratumumab, which she has tolerated well with favorable response on serologic markers and no evidence of bony disease on most recent skeletal survey  -Last treatment dispensed 24  -Cell counts are unremarkable  -Treatment will be put on hold until she is out of rehab facility and able to follow up with Dr. Cotton in the outpatient setting  -Okay to continue prophylactic acyclovir  -No other treatments planned as inpatient  -She has follow ups scheduled with our office on , , . Pending her date of discharge from rehab, she can keep these. I will let our schedulers know her discharge plan    Pneumonia  -On PO antibiotics  -Management as per other services    Weakness, Debility  -Noted plan for placement to rehab  -PT/OT following    No objection to discharge once cleared by other services and arrangements made. We will follow peripherally.     Patient discussed with Dr. Khan. Please call with questions.     Subjective/Interval History:   Patient seen and examined at bedside. She is awake and eating breakfast. She states she is feeling better today. Awaiting discharge plan. She denies any new or worsening symptoms at this time. Maintained on room air without distress, VSS, afebrile.     Past Medical History:   Diagnosis Date    Cancer (HCC)      Physical Examination :   Patient Vitals for the past 96 hrs (Last 3 readings):   Weight   04/15/24 0600 48 kg (105 lb 13.1 oz)   24 0504 51.4 kg (113 lb 5.1 oz)   24 1645 51.3 kg (113 lb)      Temperature Range: Temp: 98.1 °F (36.7 °C) Temp  Av.8 °F (36.6 °C)  Min: 97.5 °F (36.4 °C)  Max: 98.1 °F (36.7 °C)  Weight change:     Physical Exam  Vitals and nursing note  18   CREATININE 0.8  --   --  0.7 0.8   CALCIUM 8.3*  --   --  8.6 8.9    < > = values in this interval not displayed.       Medications:      azithromycin  250 mg Oral Daily    acyclovir  200 mg Oral BID    amLODIPine  5 mg Oral Daily    aspirin  81 mg Oral Daily    dicyclomine  20 mg Oral 4x Daily AC & HS    losartan  50 mg Oral Daily    rosuvastatin  5 mg Oral Daily    vitamin B-12  1,000 mcg Oral Daily    sodium chloride flush  5-40 mL IntraVENous 2 times per day    enoxaparin  40 mg SubCUTAneous Daily     Star Bravo, APRN - CNP  [facility]

## 2024-04-16 NOTE — PROGRESS NOTES
[facility]  Office: [phone]  Trae Melendez DO, Darshan Leach, DO, Michael Valdes DO, Bandar Duggan, DO, Becca Urbano MD, Giana Flores MD, Shoaib Enciso MD, Madelin Rdz MD,  Ian Reyes MD, Latanya Soares MD, Diana Burgess MD,  Evangelina Bryson DO, Dana Churchill MD, Serge Ornelas MD, Tony Melendez DO, Sally Gilman MD,  Mikey Zhang DO, Chrissie Bhakta MD, Blank Rico MD, Joanne Aldridge MD, Kimberly Corcoran MD,  Robbie Gore MD, Chantal Scott MD, Levi Cornell MD, Sai Morejon MD, Holden Conway MD, Christine Zazueta MD, Quinn Sánchez DO, Donnell Baez DO, Maria Eugenia Mesa MD,  Aroldo Villanueva MD, Shirley Waterhouse, CNP,  Arleen Johnson CNP, Rosendo Gonzalez, CNP,  Arabella Rodas, DNP, Idalmis Villalpando, CNP, Leida Ko, CNP, Teetee Adam, CNP, Lily Souza CNP, Afshan Aranda PALuhC, Susie Frost PALuhC, Valentine Eduardo, CNP, Tresa Gurrola, CNP, Tammie Mahan, CNP, Lulu Navarro, CNP, Debi Rodriguez, CNP, Nuria Michel, CNS, Hansa Butler, CNP, Sujata Matthews CNP, Tracy Schwab, CNP         [facility]   IN-PATIENT SERVICE   [facility]    Progress Note    4/16/2024    11:02 AM    Name:   Dianne Siegel  MRN:     [id number]     Acct:      [id number]   Room:   310/310-01   Day:  3  Admit Date:  4/12/2024  4:40 PM    PCP:   Trae Bravo PA-C  Code Status:  Full Code    Subjective:     Patient was seen and examined at bedside this AM. She reports feeling better overall but continues to endorse some shortness of breath. The patient remains on room air. Awaiting placement.     Medications:     Allergies:    Allergies   Allergen Reactions    Latex      Other reaction(s): Unknown      Adhesive Tape     Ciprofloxacin Dizziness or Vertigo     Other reaction(s): cant remember what SE was      Penicillins Hives     Other reaction(s): Hives         Current Meds:   Scheduled Meds:    azithromycin  250 mg Oral Daily    acyclovir  200 mg Oral BID    amLODIPine

## 2024-04-16 NOTE — CARE COORDINATION
Writer spoke with Christianne @ [facility] authorization rec'd can transport today.       1245 [facility] to transport @ 3pm by wheelchair to [facility]  VM to Christianne at [facility] .   Patient notified and agreeable, Kofi notified.       PACKET     AVS/ISABEL  24hr Mar  H & P

## 2024-04-17 ENCOUNTER — HOSPITAL ENCOUNTER (OUTPATIENT)
Age: 84
Setting detail: SPECIMEN
Discharge: HOME | End: 2024-04-17

## 2024-04-17 LAB
ALBUMIN SERPL-MCNC: 3 G/DL (ref 3.5–5.2)
ALP SERPL-CCNC: 99 U/L (ref 35–104)
ALT SERPL-CCNC: 21 U/L (ref 5–33)
ANION GAP SERPL CALCULATED.3IONS-SCNC: 11 MMOL/L (ref 9–17)
AST SERPL-CCNC: 20 U/L
BILIRUB SERPL-MCNC: 0.5 MG/DL (ref 0.3–1.2)
BUN SERPL-MCNC: 19 MG/DL (ref 8–23)
BUN/CREAT SERPL: 24 (ref 9–20)
CALCIUM SERPL-MCNC: 9.6 MG/DL (ref 8.6–10.4)
CHLORIDE SERPL-SCNC: 98 MMOL/L (ref 98–107)
CO2 SERPL-SCNC: 25 MMOL/L (ref 20–31)
CREAT SERPL-MCNC: 0.8 MG/DL (ref 0.5–0.9)
EKG ATRIAL RATE: 72 BPM
EKG P AXIS: 67 DEGREES
EKG Q-T INTERVAL: 452 MS
EKG QRS DURATION: 180 MS
EKG QTC CALCULATION (BAZETT): 494 MS
EKG R AXIS: -74 DEGREES
EKG T AXIS: 92 DEGREES
EKG VENTRICULAR RATE: 72 BPM
ERYTHROCYTE [DISTWIDTH] IN BLOOD BY AUTOMATED COUNT: 14.8 % (ref 11.8–14.4)
GFR SERPL CREATININE-BSD FRML MDRD: 72 ML/MIN/1.73M2
GLUCOSE SERPL-MCNC: 129 MG/DL (ref 70–99)
HCT VFR BLD AUTO: 37.3 % (ref 36.3–47.1)
HGB BLD-MCNC: 12.6 G/DL (ref 11.9–15.1)
MCH RBC QN AUTO: 32.4 PG (ref 25.2–33.5)
MCHC RBC AUTO-ENTMCNC: 33.8 G/DL (ref 28.4–34.8)
MCV RBC AUTO: 95.9 FL (ref 82.6–102.9)
NRBC BLD-RTO: 0 PER 100 WBC
PLATELET # BLD AUTO: 189 K/UL (ref 138–453)
PMV BLD AUTO: 9.9 FL (ref 8.1–13.5)
POTASSIUM SERPL-SCNC: 3.7 MMOL/L (ref 3.7–5.3)
PROT SERPL-MCNC: 5.9 G/DL (ref 6.4–8.3)
RBC # BLD AUTO: 3.89 M/UL (ref 3.95–5.11)
SODIUM SERPL-SCNC: 134 MMOL/L (ref 135–144)
WBC OTHER # BLD: 9.9 K/UL (ref 3.5–11.3)

## 2024-04-17 PROCEDURE — 80053 COMPREHEN METABOLIC PANEL: CPT

## 2024-04-17 PROCEDURE — 36415 COLL VENOUS BLD VENIPUNCTURE: CPT

## 2024-04-17 PROCEDURE — 85027 COMPLETE CBC AUTOMATED: CPT

## 2024-04-17 PROCEDURE — P9603 ONE-WAY ALLOW PRORATED MILES: HCPCS

## 2024-04-19 ENCOUNTER — HOSPITAL ENCOUNTER (OUTPATIENT)
Age: 84
Setting detail: SPECIMEN
Discharge: HOME | End: 2024-04-19

## 2024-04-19 LAB
ANION GAP SERPL CALCULATED.3IONS-SCNC: 11 MMOL/L (ref 9–17)
BUN SERPL-MCNC: 25 MG/DL (ref 8–23)
BUN/CREAT SERPL: 25 (ref 9–20)
CALCIUM SERPL-MCNC: 9.7 MG/DL (ref 8.6–10.4)
CHLORIDE SERPL-SCNC: 99 MMOL/L (ref 98–107)
CO2 SERPL-SCNC: 24 MMOL/L (ref 20–31)
CREAT SERPL-MCNC: 1 MG/DL (ref 0.5–0.9)
ERYTHROCYTE [DISTWIDTH] IN BLOOD BY AUTOMATED COUNT: 14.5 % (ref 11.8–14.4)
GFR SERPL CREATININE-BSD FRML MDRD: 55 ML/MIN/1.73M2
GLUCOSE SERPL-MCNC: 104 MG/DL (ref 70–99)
HCT VFR BLD AUTO: 38.3 % (ref 36.3–47.1)
HGB BLD-MCNC: 13.2 G/DL (ref 11.9–15.1)
MCH RBC QN AUTO: 32.7 PG (ref 25.2–33.5)
MCHC RBC AUTO-ENTMCNC: 34.5 G/DL (ref 28.4–34.8)
MCV RBC AUTO: 94.8 FL (ref 82.6–102.9)
NRBC BLD-RTO: 0 PER 100 WBC
PLATELET # BLD AUTO: 215 K/UL (ref 138–453)
PMV BLD AUTO: 9.7 FL (ref 8.1–13.5)
POTASSIUM SERPL-SCNC: 3.5 MMOL/L (ref 3.7–5.3)
RBC # BLD AUTO: 4.04 M/UL (ref 3.95–5.11)
SODIUM SERPL-SCNC: 134 MMOL/L (ref 135–144)
WBC OTHER # BLD: 12.9 K/UL (ref 3.5–11.3)

## 2024-04-19 PROCEDURE — 80048 BASIC METABOLIC PNL TOTAL CA: CPT

## 2024-04-19 PROCEDURE — 81001 URINALYSIS AUTO W/SCOPE: CPT

## 2024-04-19 PROCEDURE — 36415 COLL VENOUS BLD VENIPUNCTURE: CPT

## 2024-04-19 PROCEDURE — 85027 COMPLETE CBC AUTOMATED: CPT

## 2024-04-19 PROCEDURE — P9603 ONE-WAY ALLOW PRORATED MILES: HCPCS

## 2024-04-20 LAB
BACTERIA URNS QL MICRO: ABNORMAL
BILIRUB UR QL STRIP: NEGATIVE
CLARITY UR: CLEAR
COLOR UR: YELLOW
EPI CELLS #/AREA URNS HPF: ABNORMAL /HPF (ref 0–5)
GLUCOSE UR STRIP-MCNC: ABNORMAL MG/DL
HGB UR QL STRIP.AUTO: NEGATIVE
KETONES UR STRIP-MCNC: NEGATIVE MG/DL
LEUKOCYTE ESTERASE UR QL STRIP: NEGATIVE
NITRITE UR QL STRIP: NEGATIVE
PH UR STRIP: 6.5 [PH] (ref 5–8)
PROT UR STRIP-MCNC: ABNORMAL MG/DL
RBC #/AREA URNS HPF: ABNORMAL /HPF (ref 0–2)
SP GR UR STRIP: 1.02 (ref 1–1.03)
UROBILINOGEN UR STRIP-ACNC: NORMAL EU/DL (ref 0–1)
WBC #/AREA URNS HPF: ABNORMAL /HPF (ref 0–5)

## 2024-04-23 ENCOUNTER — HOSPITAL ENCOUNTER (OUTPATIENT)
Age: 84
Setting detail: SPECIMEN
Discharge: HOME | End: 2024-04-23

## 2024-04-23 LAB
ANION GAP SERPL CALCULATED.3IONS-SCNC: 11 MMOL/L (ref 9–17)
BUN SERPL-MCNC: 33 MG/DL (ref 8–23)
BUN/CREAT SERPL: 25 (ref 9–20)
CALCIUM SERPL-MCNC: 10.1 MG/DL (ref 8.6–10.4)
CHLORIDE SERPL-SCNC: 98 MMOL/L (ref 98–107)
CO2 SERPL-SCNC: 26 MMOL/L (ref 20–31)
CREAT SERPL-MCNC: 1.3 MG/DL (ref 0.5–0.9)
ERYTHROCYTE [DISTWIDTH] IN BLOOD BY AUTOMATED COUNT: 14.6 % (ref 11.8–14.4)
GFR SERPL CREATININE-BSD FRML MDRD: 40 ML/MIN/1.73M2
GLUCOSE SERPL-MCNC: 112 MG/DL (ref 70–99)
HCT VFR BLD AUTO: 39.6 % (ref 36.3–47.1)
HGB BLD-MCNC: 13.4 G/DL (ref 11.9–15.1)
MCH RBC QN AUTO: 32.8 PG (ref 25.2–33.5)
MCHC RBC AUTO-ENTMCNC: 33.8 G/DL (ref 28.4–34.8)
MCV RBC AUTO: 96.8 FL (ref 82.6–102.9)
NRBC BLD-RTO: 0 PER 100 WBC
PLATELET # BLD AUTO: 274 K/UL (ref 138–453)
PMV BLD AUTO: 9.7 FL (ref 8.1–13.5)
POTASSIUM SERPL-SCNC: 3.5 MMOL/L (ref 3.7–5.3)
RBC # BLD AUTO: 4.09 M/UL (ref 3.95–5.11)
SODIUM SERPL-SCNC: 135 MMOL/L (ref 135–144)
WBC OTHER # BLD: 12.1 K/UL (ref 3.5–11.3)

## 2024-04-23 PROCEDURE — 80048 BASIC METABOLIC PNL TOTAL CA: CPT

## 2024-04-23 PROCEDURE — 85027 COMPLETE CBC AUTOMATED: CPT

## 2024-04-23 PROCEDURE — 36415 COLL VENOUS BLD VENIPUNCTURE: CPT

## 2024-04-23 PROCEDURE — P9603 ONE-WAY ALLOW PRORATED MILES: HCPCS

## 2024-04-26 ENCOUNTER — HOSPITAL ENCOUNTER (OUTPATIENT)
Age: 84
Setting detail: SPECIMEN
Discharge: HOME | End: 2024-04-26

## 2024-04-26 LAB
ANION GAP SERPL CALCULATED.3IONS-SCNC: 8 MMOL/L (ref 9–17)
BUN SERPL-MCNC: 25 MG/DL (ref 8–23)
BUN/CREAT SERPL: 23 (ref 9–20)
CALCIUM SERPL-MCNC: 9.6 MG/DL (ref 8.6–10.4)
CHLORIDE SERPL-SCNC: 102 MMOL/L (ref 98–107)
CO2 SERPL-SCNC: 24 MMOL/L (ref 20–31)
CREAT SERPL-MCNC: 1.1 MG/DL (ref 0.5–0.9)
ERYTHROCYTE [DISTWIDTH] IN BLOOD BY AUTOMATED COUNT: 14.6 % (ref 11.8–14.4)
GFR SERPL CREATININE-BSD FRML MDRD: 49 ML/MIN/1.73M2
GLUCOSE SERPL-MCNC: 96 MG/DL (ref 70–99)
HCT VFR BLD AUTO: 39.5 % (ref 36.3–47.1)
HGB BLD-MCNC: 13.2 G/DL (ref 11.9–15.1)
MCH RBC QN AUTO: 32.8 PG (ref 25.2–33.5)
MCHC RBC AUTO-ENTMCNC: 33.4 G/DL (ref 28.4–34.8)
MCV RBC AUTO: 98 FL (ref 82.6–102.9)
NRBC BLD-RTO: 0 PER 100 WBC
PLATELET # BLD AUTO: 234 K/UL (ref 138–453)
PMV BLD AUTO: 9.3 FL (ref 8.1–13.5)
POTASSIUM SERPL-SCNC: 3.7 MMOL/L (ref 3.7–5.3)
RBC # BLD AUTO: 4.03 M/UL (ref 3.95–5.11)
SODIUM SERPL-SCNC: 134 MMOL/L (ref 135–144)
WBC OTHER # BLD: 11.9 K/UL (ref 3.5–11.3)

## 2024-04-26 PROCEDURE — 80048 BASIC METABOLIC PNL TOTAL CA: CPT

## 2024-04-26 PROCEDURE — 85027 COMPLETE CBC AUTOMATED: CPT

## 2024-04-26 PROCEDURE — 36415 COLL VENOUS BLD VENIPUNCTURE: CPT

## 2024-04-26 PROCEDURE — P9603 ONE-WAY ALLOW PRORATED MILES: HCPCS

## 2024-05-07 ENCOUNTER — HOSPITAL ENCOUNTER (OUTPATIENT)
Age: 84
Setting detail: SPECIMEN
Discharge: HOME | End: 2024-05-07
Payer: COMMERCIAL

## 2024-05-07 LAB
ANION GAP SERPL CALCULATED.3IONS-SCNC: 14 MMOL/L (ref 9–16)
BUN SERPL-MCNC: 19 MG/DL (ref 8–23)
CALCIUM SERPL-MCNC: 9.6 MG/DL (ref 8.6–10.4)
CHLORIDE SERPL-SCNC: 98 MMOL/L (ref 98–107)
CO2 SERPL-SCNC: 21 MMOL/L (ref 20–31)
CREAT SERPL-MCNC: 1 MG/DL (ref 0.5–0.9)
ERYTHROCYTE [DISTWIDTH] IN BLOOD BY AUTOMATED COUNT: 14.9 % (ref 11.8–14.4)
GFR, ESTIMATED: 52 ML/MIN/1.73M2
GLUCOSE SERPL-MCNC: 91 MG/DL (ref 74–99)
HCT VFR BLD AUTO: 34 % (ref 36.3–47.1)
HGB BLD-MCNC: 11.6 G/DL (ref 11.9–15.1)
MCH RBC QN AUTO: 33.4 PG (ref 25.2–33.5)
MCHC RBC AUTO-ENTMCNC: 34.1 G/DL (ref 28.4–34.8)
MCV RBC AUTO: 98 FL (ref 82.6–102.9)
NRBC BLD-RTO: 0 PER 100 WBC
PLATELET # BLD AUTO: 175 K/UL (ref 138–453)
PMV BLD AUTO: 9.3 FL (ref 8.1–13.5)
POTASSIUM SERPL-SCNC: 4.2 MMOL/L (ref 3.7–5.3)
RBC # BLD AUTO: 3.47 M/UL (ref 3.95–5.11)
SODIUM SERPL-SCNC: 133 MMOL/L (ref 136–145)
WBC OTHER # BLD: 13 K/UL (ref 3.5–11.3)

## 2024-05-07 PROCEDURE — 85027 COMPLETE CBC AUTOMATED: CPT

## 2024-05-07 PROCEDURE — 80048 BASIC METABOLIC PNL TOTAL CA: CPT

## 2024-11-15 ENCOUNTER — HOSPITAL ENCOUNTER (EMERGENCY)
Age: 84
Discharge: HOME | End: 2024-11-15
Attending: EMERGENCY MEDICINE
Payer: COMMERCIAL

## 2024-11-15 ENCOUNTER — APPOINTMENT (OUTPATIENT)
Dept: CT IMAGING | Age: 84
End: 2024-11-15
Payer: COMMERCIAL

## 2024-11-15 ENCOUNTER — APPOINTMENT (OUTPATIENT)
Dept: GENERAL RADIOLOGY | Age: 84
End: 2024-11-15
Payer: COMMERCIAL

## 2024-11-15 VITALS
BODY MASS INDEX: 17.58 KG/M2 | HEART RATE: 69 BPM | WEIGHT: 103 LBS | SYSTOLIC BLOOD PRESSURE: 114 MMHG | RESPIRATION RATE: 13 BRPM | HEIGHT: 64 IN | OXYGEN SATURATION: 94 % | TEMPERATURE: 98.6 F | DIASTOLIC BLOOD PRESSURE: 80 MMHG

## 2024-11-15 DIAGNOSIS — I95.1 ORTHOSTATIC HYPOTENSION: ICD-10-CM

## 2024-11-15 DIAGNOSIS — R53.83 OTHER FATIGUE: Primary | ICD-10-CM

## 2024-11-15 DIAGNOSIS — R53.1 GENERAL WEAKNESS: ICD-10-CM

## 2024-11-15 PROBLEM — R53.81 PHYSICAL DEBILITY: Status: ACTIVE | Noted: 2024-11-15

## 2024-11-15 LAB
ANION GAP SERPL CALCULATED.3IONS-SCNC: 10 MMOL/L (ref 9–17)
BACTERIA URNS QL MICRO: ABNORMAL
BASOPHILS # BLD: 0 K/UL (ref 0–0.2)
BASOPHILS NFR BLD: 0 % (ref 0–2)
BILIRUB UR QL STRIP: NEGATIVE
BNP SERPL-MCNC: 2554 PG/ML
BUN SERPL-MCNC: 19 MG/DL (ref 8–23)
CALCIUM SERPL-MCNC: 9 MG/DL (ref 8.6–10.4)
CHARACTER UR: ABNORMAL
CHLORIDE SERPL-SCNC: 98 MMOL/L (ref 98–107)
CLARITY UR: CLEAR
CO2 SERPL-SCNC: 26 MMOL/L (ref 20–31)
COLOR UR: YELLOW
CREAT SERPL-MCNC: 1.2 MG/DL (ref 0.5–0.9)
EOSINOPHIL # BLD: 0 K/UL (ref 0–0.4)
EOSINOPHILS RELATIVE PERCENT: 0 % (ref 1–4)
EPI CELLS #/AREA URNS HPF: ABNORMAL /HPF (ref 0–5)
ERYTHROCYTE [DISTWIDTH] IN BLOOD BY AUTOMATED COUNT: 13.9 % (ref 12.5–15.4)
GFR, ESTIMATED: 44 ML/MIN/1.73M2
GLUCOSE SERPL-MCNC: 102 MG/DL (ref 70–99)
GLUCOSE UR STRIP-MCNC: NEGATIVE MG/DL
HCT VFR BLD AUTO: 43 % (ref 36–46)
HGB BLD-MCNC: 14.8 G/DL (ref 12–16)
HGB UR QL STRIP.AUTO: ABNORMAL
KETONES UR STRIP-MCNC: NEGATIVE MG/DL
LEUKOCYTE ESTERASE UR QL STRIP: NEGATIVE
LYMPHOCYTES NFR BLD: 0.6 K/UL (ref 1–4.8)
LYMPHOCYTES RELATIVE PERCENT: 6 % (ref 24–44)
MCH RBC QN AUTO: 34 PG (ref 26–34)
MCHC RBC AUTO-ENTMCNC: 34.4 G/DL (ref 31–37)
MCV RBC AUTO: 98.9 FL (ref 80–100)
MONOCYTES NFR BLD: 1 K/UL (ref 0.1–1.2)
MONOCYTES NFR BLD: 10 % (ref 2–11)
NEUTROPHILS NFR BLD: 84 % (ref 36–66)
NEUTS SEG NFR BLD: 8.6 K/UL (ref 1.8–7.7)
NITRITE UR QL STRIP: NEGATIVE
PH UR STRIP: 7 [PH] (ref 5–8)
PLATELET # BLD AUTO: 193 K/UL (ref 140–450)
PMV BLD AUTO: 6.7 FL (ref 6–12)
POTASSIUM SERPL-SCNC: 4.3 MMOL/L (ref 3.7–5.3)
PROT UR STRIP-MCNC: ABNORMAL MG/DL
RBC # BLD AUTO: 4.34 M/UL (ref 4–5.2)
RBC #/AREA URNS HPF: ABNORMAL /HPF (ref 0–2)
SODIUM SERPL-SCNC: 134 MMOL/L (ref 135–144)
SP GR UR STRIP: 1.01 (ref 1–1.03)
TROPONIN I SERPL HS-MCNC: 38 NG/L (ref 0–14)
TROPONIN I SERPL HS-MCNC: 40 NG/L (ref 0–14)
UROBILINOGEN UR STRIP-ACNC: NORMAL EU/DL (ref 0–1)
WBC #/AREA URNS HPF: ABNORMAL /HPF (ref 0–5)
WBC OTHER # BLD: 10.2 K/UL (ref 3.5–11)

## 2024-11-15 PROCEDURE — 80048 BASIC METABOLIC PNL TOTAL CA: CPT

## 2024-11-15 PROCEDURE — 70450 CT HEAD/BRAIN W/O DYE: CPT

## 2024-11-15 PROCEDURE — 93005 ELECTROCARDIOGRAM TRACING: CPT | Performed by: EMERGENCY MEDICINE

## 2024-11-15 PROCEDURE — 72125 CT NECK SPINE W/O DYE: CPT

## 2024-11-15 PROCEDURE — 71045 X-RAY EXAM CHEST 1 VIEW: CPT

## 2024-11-15 PROCEDURE — 99285 EMERGENCY DEPT VISIT HI MDM: CPT

## 2024-11-15 PROCEDURE — 81001 URINALYSIS AUTO W/SCOPE: CPT

## 2024-11-15 PROCEDURE — 85025 COMPLETE CBC W/AUTO DIFF WBC: CPT

## 2024-11-15 PROCEDURE — 36415 COLL VENOUS BLD VENIPUNCTURE: CPT

## 2024-11-15 PROCEDURE — 83880 ASSAY OF NATRIURETIC PEPTIDE: CPT

## 2024-11-15 PROCEDURE — G0378 HOSPITAL OBSERVATION PER HR: HCPCS

## 2024-11-15 PROCEDURE — 84484 ASSAY OF TROPONIN QUANT: CPT

## 2024-11-15 PROCEDURE — 2580000003 HC RX 258: Performed by: EMERGENCY MEDICINE

## 2024-11-15 RX ORDER — ONDANSETRON 2 MG/ML
4 INJECTION INTRAMUSCULAR; INTRAVENOUS EVERY 6 HOURS PRN
Status: CANCELLED | OUTPATIENT
Start: 2024-11-15

## 2024-11-15 RX ORDER — SODIUM CHLORIDE 0.9 % (FLUSH) 0.9 %
5-40 SYRINGE (ML) INJECTION EVERY 12 HOURS SCHEDULED
Status: CANCELLED | OUTPATIENT
Start: 2024-11-15

## 2024-11-15 RX ORDER — ONDANSETRON 4 MG/1
4 TABLET, ORALLY DISINTEGRATING ORAL EVERY 8 HOURS PRN
Status: CANCELLED | OUTPATIENT
Start: 2024-11-15

## 2024-11-15 RX ORDER — ROSUVASTATIN CALCIUM 5 MG/1
5 TABLET, COATED ORAL DAILY
Status: CANCELLED | OUTPATIENT
Start: 2024-11-15

## 2024-11-15 RX ORDER — HEPARIN SODIUM 5000 [USP'U]/ML
5000 INJECTION, SOLUTION INTRAVENOUS; SUBCUTANEOUS 2 TIMES DAILY
Status: CANCELLED | OUTPATIENT
Start: 2024-11-15

## 2024-11-15 RX ORDER — SODIUM CHLORIDE 9 MG/ML
INJECTION, SOLUTION INTRAVENOUS PRN
Status: CANCELLED | OUTPATIENT
Start: 2024-11-15

## 2024-11-15 RX ORDER — SODIUM CHLORIDE 9 MG/ML
INJECTION, SOLUTION INTRAVENOUS CONTINUOUS
Status: CANCELLED | OUTPATIENT
Start: 2024-11-15 | End: 2024-11-16

## 2024-11-15 RX ORDER — POLYETHYLENE GLYCOL 3350 17 G/17G
17 POWDER, FOR SOLUTION ORAL DAILY PRN
Status: CANCELLED | OUTPATIENT
Start: 2024-11-15

## 2024-11-15 RX ORDER — ACETAMINOPHEN 650 MG/1
650 SUPPOSITORY RECTAL EVERY 6 HOURS PRN
Status: CANCELLED | OUTPATIENT
Start: 2024-11-15

## 2024-11-15 RX ORDER — 0.9 % SODIUM CHLORIDE 0.9 %
500 INTRAVENOUS SOLUTION INTRAVENOUS ONCE
Status: COMPLETED | OUTPATIENT
Start: 2024-11-15 | End: 2024-11-15

## 2024-11-15 RX ORDER — SODIUM CHLORIDE 0.9 % (FLUSH) 0.9 %
5-40 SYRINGE (ML) INJECTION PRN
Status: CANCELLED | OUTPATIENT
Start: 2024-11-15

## 2024-11-15 RX ORDER — ASPIRIN 81 MG/1
81 TABLET ORAL DAILY
Status: CANCELLED | OUTPATIENT
Start: 2024-11-16

## 2024-11-15 RX ORDER — ACETAMINOPHEN 325 MG/1
650 TABLET ORAL EVERY 6 HOURS PRN
Status: CANCELLED | OUTPATIENT
Start: 2024-11-15

## 2024-11-15 RX ORDER — ENOXAPARIN SODIUM 100 MG/ML
40 INJECTION SUBCUTANEOUS DAILY
Status: CANCELLED | OUTPATIENT
Start: 2024-11-16

## 2024-11-15 RX ORDER — LOSARTAN POTASSIUM 25 MG/1
50 TABLET ORAL DAILY
Status: CANCELLED | OUTPATIENT
Start: 2024-11-16

## 2024-11-15 RX ADMIN — SODIUM CHLORIDE 500 ML: 9 INJECTION, SOLUTION INTRAVENOUS at 12:17

## 2024-11-15 ASSESSMENT — PAIN - FUNCTIONAL ASSESSMENT: PAIN_FUNCTIONAL_ASSESSMENT: NONE - DENIES PAIN

## 2024-11-15 NOTE — DISCHARGE INSTRUCTIONS
Increase fluids as tolerated  Return immediately if any worsening symptoms or any other concerns    Please understand that early in the process of an illness or injury, an emergency department workup can be falsely reassuring.      Tell us how we did visit: [email or web address]   and let us know about your experience  .

## 2024-11-15 NOTE — ED PROVIDER NOTES
Troponin, High Sensitivity 38 (H) 0 - 14 ng/L   Brain Natriuretic Peptide   Result Value Ref Range    NT Pro-BNP 2,554 (H) <300 pg/mL   Urinalysis with Reflex to Culture    Specimen: Urine, clean catch   Result Value Ref Range    Color, UA Yellow Yellow    Turbidity UA Clear Clear    Glucose, Ur NEGATIVE NEGATIVE mg/dL    Bilirubin, Urine NEGATIVE NEGATIVE    Ketones, Urine NEGATIVE NEGATIVE mg/dL    Specific Gravity, UA 1.015 1.005 - 1.030    Urine Hgb TRACE (A) NEGATIVE    pH, Urine 7.0 5.0 - 8.0    Protein, UA 2+ (A) NEGATIVE mg/dL    Urobilinogen, Urine Normal 0.0 - 1.0 EU/dL    Nitrite, Urine NEGATIVE NEGATIVE    Leukocyte Esterase, Urine NEGATIVE NEGATIVE   Microscopic Urinalysis   Result Value Ref Range    WBC, UA 0 TO 2 0 - 5 /HPF    RBC, UA 2 TO 5 0 - 2 /HPF    Epithelial Cells, UA 0 TO 2 0 - 5 /HPF    Bacteria, UA FEW (A) None    Other Observations UA (A) NOT REQ.     Utilizing a urinalysis as the only screening method to exclude a potential uropathogen can be unreliable in many patient populations.  Rapid screening tests are less sensitive than culture and if UTI is a clinical possibility, culture should be considered despite a negative urinalysis.         Not indicated unless otherwise documented above    RADIOLOGY:   I reviewed the radiologist interpretations:    CT CERVICAL SPINE WO CONTRAST   Preliminary Result   No acute abnormality of the cervical spine.         CT HEAD WO CONTRAST   Final Result   Chronic findings in the brain without acute CT abnormality identified.         XR CHEST PORTABLE   Final Result   1. No acute abnormality.   2. Improved hazy patchy opacities bilaterally, best appreciated right base.             Not indicated unless otherwise documented above    EMERGENCY DEPARTMENT COURSE:     The patient was given the following medications:  Orders Placed This Encounter   Medications    sodium chloride 0.9 % bolus 500 mL        Vitals:   -------------------------  /80   Pulse 69    Temp 98.6 °F (37 °C) (Oral)   Resp 13   Ht 1.626 m (5' 4\")   Wt 46.7 kg (103 lb)   SpO2 94%   BMI 17.68 kg/m²     10:45 AM resting comfortably labs normal CBC normal electrolytes and kidney function for her.  Troponin of 40 similar to previous awaiting second troponin.  Awaiting urinalysis and imaging.  BNP of 2500.    11:15 AM continues to rest comfortably awaiting CAT scan results.  Chest x-ray shows improved patchy opacities nothing acute.  Urinalysis no signs of infection.    12:15 PM CAT scan of the head and cervical spine unremarkable.  Orthostatics were attempted patient got extremely lightheaded and weak and blood pressure did drop with standing.  Will admit for orthostatic hypotension and fatigue.    12:20 PM discussed with Dr. Ornelas via [facility] internal medicine agrees to admission    12:40 PM repeat troponin 38 patient now wants to go home.  She did talk with Dr. Ornelas.  Does not want to stay in the hospital.  Will discharge with close follow-up.  Increase fluids as tolerated.  Return if worsening symptoms or any other concerns    CRITICAL CARE:    None    PROCEDURES:    None      OARRS Report if indicated           FINAL IMPRESSION      1. Other fatigue    2. General weakness    3. Orthostatic hypotension          DISPOSITION/PLAN   DISPOSITION Discharge - Pending Orders Complete 11/15/2024 12:42:58 PM         CONDITION ON DISPOSITION: See chart     PATIENT REFERRED TO:  Trae Bravo, PALuhC  [address] [place] [address] [phone]    Schedule an appointment as soon as possible for a visit in 2 days        DISCHARGE MEDICATIONS:  New Prescriptions    No medications on file           (Please note that portions of this note were completed with a voice recognition program.  Efforts were made to edit the dictations but occasionally words are mis-transcribed.  Additionally, portions of this note may also include information that was incorporated after care transfer to another provider

## 2024-11-15 NOTE — PROGRESS NOTES
Physician was called to admit this patient for dehydration, physical/occupational therapy and possible rehab placement. The patient is refusing admission at this time.

## 2024-11-17 LAB
EKG ATRIAL RATE: 62 BPM
EKG P AXIS: 78 DEGREES
EKG P-R INTERVAL: 176 MS
EKG Q-T INTERVAL: 468 MS
EKG QRS DURATION: 164 MS
EKG QTC CALCULATION (BAZETT): 475 MS
EKG R AXIS: -77 DEGREES
EKG T AXIS: 90 DEGREES
EKG VENTRICULAR RATE: 62 BPM

## 2024-11-17 PROCEDURE — 93010 ELECTROCARDIOGRAM REPORT: CPT | Performed by: INTERNAL MEDICINE
